# Patient Record
Sex: MALE | Race: WHITE | NOT HISPANIC OR LATINO | Employment: OTHER
[De-identification: names, ages, dates, MRNs, and addresses within clinical notes are randomized per-mention and may not be internally consistent; named-entity substitution may affect disease eponyms.]

---

## 2017-01-19 ENCOUNTER — CHARTING TRANS (OUTPATIENT)
Dept: OTHER | Age: 67
End: 2017-01-19

## 2017-01-31 ENCOUNTER — CHARTING TRANS (OUTPATIENT)
Dept: OTHER | Age: 67
End: 2017-01-31

## 2017-02-07 ENCOUNTER — CHARTING TRANS (OUTPATIENT)
Dept: OTHER | Age: 67
End: 2017-02-07

## 2017-02-16 ENCOUNTER — TELEPHONE (OUTPATIENT)
Dept: FAMILY MEDICINE CLINIC | Facility: CLINIC | Age: 67
End: 2017-02-16

## 2017-03-03 ENCOUNTER — OFFICE VISIT (OUTPATIENT)
Dept: FAMILY MEDICINE CLINIC | Facility: CLINIC | Age: 67
End: 2017-03-03

## 2017-03-03 VITALS
BODY MASS INDEX: 27 KG/M2 | SYSTOLIC BLOOD PRESSURE: 132 MMHG | WEIGHT: 202 LBS | DIASTOLIC BLOOD PRESSURE: 86 MMHG | RESPIRATION RATE: 16 BRPM | TEMPERATURE: 98 F | HEART RATE: 76 BPM | OXYGEN SATURATION: 98 %

## 2017-03-03 DIAGNOSIS — R05.9 COUGH: ICD-10-CM

## 2017-03-03 DIAGNOSIS — J01.90 ACUTE SINUSITIS, RECURRENCE NOT SPECIFIED, UNSPECIFIED LOCATION: Primary | ICD-10-CM

## 2017-03-03 PROCEDURE — 99213 OFFICE O/P EST LOW 20 MIN: CPT | Performed by: PHYSICIAN ASSISTANT

## 2017-03-03 RX ORDER — AMOXICILLIN AND CLAVULANATE POTASSIUM 875; 125 MG/1; MG/1
1 TABLET, FILM COATED ORAL 2 TIMES DAILY
Qty: 20 TABLET | Refills: 0 | Status: SHIPPED | OUTPATIENT
Start: 2017-03-03 | End: 2017-03-13

## 2017-03-03 RX ORDER — CODEINE PHOSPHATE AND GUAIFENESIN 10; 100 MG/5ML; MG/5ML
10 SOLUTION ORAL NIGHTLY PRN
Qty: 120 ML | Refills: 0 | Status: SHIPPED | OUTPATIENT
Start: 2017-03-03 | End: 2017-03-13

## 2017-03-03 NOTE — PATIENT INSTRUCTIONS
Self-Care for Sinusitis     Drinking plenty of water can help sinuses drain. Sinusitis can often be managed with self-care. Self-care can keep sinuses moist and make you feel more comfortable. Remember to follow your doctor's instructions closely.  This Colds, flu, and allergies make it more likely for you to get sinusitis. Do your best to prevent sinusitis by preventing these problems. Do what you can to avoid getting colds and other infections. Stay away from things that cause allergies (allergens).  Karin Wolf · Stay away from all types of smoke, which dries out sinus linings. This includes tobacco smoke and chemical smoke in workplace settings. · Use saltwater rinses.   Date Last Reviewed: 10/1/2016  © 7253-6847 The [address]

## 2017-03-03 NOTE — PROGRESS NOTES
CHIEF COMPLAINT:   Patient presents with:  Nasal Congestion: chest congestion. HPI:   Ramana Anguiano is a 74year old male who presents for upper and lower respiratory symptoms for  3 days.   Patient reports PND, sore throat only at the beginning of sx's ARTHROSCOPY OF JOINT UNLISTED        8/2/2014    Comment Procedure: ACHILLES TENDON REPAIR/LENGTHENING;  Surgeon: Henrik Boateng MD;  Location:  MAIN OR           Smoking Status: Never Smoker                      Alcohol Use: Yes                Comment Meds & Refills for this Visit:    Signed Prescriptions Disp Refills    Amoxicillin-Pot Clavulanate 875-125 MG Oral Tab 20 tablet 0      Sig: Take 1 tablet by mouth 2 (two) times daily.       guaiFENesin-codeine (CHERATUSSIN AC) 100-10 MG/5ML Oral Solution 1 Your doctor may prescribe medications to help treat your sinusitis. If you have an infection, antibiotics can help clear it up. If you are prescribed antibiotics, take all pills on schedule until they are gone, even if you feel better.  Decongestants help r · Sit in the nonsmoking sections of restaurants. · Don't go outdoors during peak pollution hours such as rush hour. · Keep an air conditioner on during allergy season. Clean its filter regularly.   · Ask your healthcare provider about a referral to have a

## 2017-05-21 ENCOUNTER — HOSPITAL ENCOUNTER (EMERGENCY)
Age: 67
Discharge: HOME | End: 2017-05-21
Attending: EMERGENCY MEDICINE
Payer: MEDICARE

## 2017-05-21 ENCOUNTER — APPOINTMENT (OUTPATIENT)
Dept: CT IMAGING | Age: 67
End: 2017-05-21
Attending: EMERGENCY MEDICINE
Payer: MEDICARE

## 2017-05-21 VITALS
RESPIRATION RATE: 18 BRPM | HEART RATE: 72 BPM | SYSTOLIC BLOOD PRESSURE: 144 MMHG | BODY MASS INDEX: 27.09 KG/M2 | WEIGHT: 200 LBS | OXYGEN SATURATION: 93 % | TEMPERATURE: 98 F | DIASTOLIC BLOOD PRESSURE: 83 MMHG | HEIGHT: 72 IN

## 2017-05-21 DIAGNOSIS — N20.1 URETEROLITHIASIS: ICD-10-CM

## 2017-05-21 DIAGNOSIS — N23 RENAL COLIC: Primary | ICD-10-CM

## 2017-05-21 PROCEDURE — 96376 TX/PRO/DX INJ SAME DRUG ADON: CPT

## 2017-05-21 PROCEDURE — 74176 CT ABD & PELVIS W/O CONTRAST: CPT | Performed by: EMERGENCY MEDICINE

## 2017-05-21 PROCEDURE — 96375 TX/PRO/DX INJ NEW DRUG ADDON: CPT

## 2017-05-21 PROCEDURE — 96361 HYDRATE IV INFUSION ADD-ON: CPT

## 2017-05-21 PROCEDURE — 99285 EMERGENCY DEPT VISIT HI MDM: CPT

## 2017-05-21 PROCEDURE — 81001 URINALYSIS AUTO W/SCOPE: CPT | Performed by: EMERGENCY MEDICINE

## 2017-05-21 PROCEDURE — 96374 THER/PROPH/DIAG INJ IV PUSH: CPT

## 2017-05-21 PROCEDURE — 80053 COMPREHEN METABOLIC PANEL: CPT | Performed by: EMERGENCY MEDICINE

## 2017-05-21 PROCEDURE — 93005 ELECTROCARDIOGRAM TRACING: CPT

## 2017-05-21 PROCEDURE — 93010 ELECTROCARDIOGRAM REPORT: CPT

## 2017-05-21 PROCEDURE — 83690 ASSAY OF LIPASE: CPT | Performed by: EMERGENCY MEDICINE

## 2017-05-21 PROCEDURE — 87086 URINE CULTURE/COLONY COUNT: CPT | Performed by: EMERGENCY MEDICINE

## 2017-05-21 PROCEDURE — 85025 COMPLETE CBC W/AUTO DIFF WBC: CPT | Performed by: EMERGENCY MEDICINE

## 2017-05-21 RX ORDER — ONDANSETRON 4 MG/1
4 TABLET, ORALLY DISINTEGRATING ORAL EVERY 4 HOURS PRN
Qty: 10 TABLET | Refills: 0 | Status: SHIPPED | OUTPATIENT
Start: 2017-05-21 | End: 2017-05-28

## 2017-05-21 RX ORDER — SODIUM CHLORIDE 9 MG/ML
INJECTION, SOLUTION INTRAVENOUS ONCE
Status: COMPLETED | OUTPATIENT
Start: 2017-05-21 | End: 2017-05-21

## 2017-05-21 RX ORDER — HYDROCODONE BITARTRATE AND ACETAMINOPHEN 10; 325 MG/1; MG/1
1-2 TABLET ORAL EVERY 4 HOURS PRN
Qty: 20 TABLET | Refills: 0 | Status: SHIPPED | OUTPATIENT
Start: 2017-05-21 | End: 2017-05-28

## 2017-05-21 RX ORDER — ONDANSETRON 2 MG/ML
4 INJECTION INTRAMUSCULAR; INTRAVENOUS ONCE
Status: COMPLETED | OUTPATIENT
Start: 2017-05-21 | End: 2017-05-21

## 2017-05-21 RX ORDER — HYDROMORPHONE HYDROCHLORIDE 1 MG/ML
INJECTION, SOLUTION INTRAMUSCULAR; INTRAVENOUS; SUBCUTANEOUS EVERY 30 MIN PRN
Status: DISCONTINUED | OUTPATIENT
Start: 2017-05-21 | End: 2017-05-21

## 2017-05-21 NOTE — ED PROVIDER NOTES
Patient Seen in: THE [facility] In [place]    History   Patient presents with:  Abdomen/Flank Pain (GI/)    Stated Complaint: RIGHT FLANK PAIN    HPI    Patient has a history of multiple kidney stones in the past.  He is sure he has ano Alcohol Use: Yes                Comment: holidays      Review of Systems    Positive for stated complaint: RIGHT FLANK PAIN  Other systems are as noted in HPI. Constitutional and vital signs reviewed.       All other systems reviewed a Large (*)     Protein Urine 30 mg/dL (*)     All other components within normal limits   URINE MICROSCOPIC W REFLEX CULTURE - Abnormal; Notable for the following:     WBC Urine 5-10 (*)     RBC URINE >10 (*)     Bacteria Urine Rare (*)     Hyaline Casts Pr sent home with pain medication and Zofran. We offered admission for pain control the patient feels he can manage at home. Patient is aware of the elevated creatinine and the implications.       Disposition and Plan     Clinical Impression:  Renal colic  (

## 2017-05-22 PROBLEM — N20.1 RIGHT URETERAL STONE: Status: ACTIVE | Noted: 2017-05-22

## 2017-05-22 PROBLEM — R10.9 RIGHT FLANK PAIN: Status: ACTIVE | Noted: 2017-05-22

## 2017-05-30 PROBLEM — N20.0 BILATERAL KIDNEY STONES: Status: ACTIVE | Noted: 2017-05-30

## 2017-06-08 PROCEDURE — 82507 ASSAY OF CITRATE: CPT | Performed by: UROLOGY

## 2017-06-08 PROCEDURE — 82436 ASSAY OF URINE CHLORIDE: CPT | Performed by: UROLOGY

## 2017-06-08 PROCEDURE — 83945 ASSAY OF OXALATE: CPT | Performed by: UROLOGY

## 2017-06-08 PROCEDURE — 82340 ASSAY OF CALCIUM IN URINE: CPT | Performed by: UROLOGY

## 2017-06-08 PROCEDURE — 84392 ASSAY OF URINE SULFATE: CPT | Performed by: UROLOGY

## 2017-07-19 PROBLEM — N40.1 BENIGN PROSTATIC HYPERPLASIA WITH LOWER URINARY TRACT SYMPTOMS, SYMPTOM DETAILS UNSPECIFIED: Status: ACTIVE | Noted: 2017-07-19

## 2017-07-19 PROBLEM — N20.0 HYPOCITRATURIC CALCIUM NEPHROLITHIASIS: Status: ACTIVE | Noted: 2017-07-19

## 2017-10-26 ENCOUNTER — CHARTING TRANS (OUTPATIENT)
Dept: GASTROENTEROLOGY | Age: 67
End: 2017-10-26

## 2017-11-17 ENCOUNTER — HOSPITAL ENCOUNTER (OUTPATIENT)
Dept: GENERAL RADIOLOGY | Age: 67
Discharge: HOME | End: 2017-11-17
Attending: PHYSICIAN ASSISTANT
Payer: MEDICARE

## 2017-11-17 ENCOUNTER — OFFICE VISIT (OUTPATIENT)
Dept: FAMILY MEDICINE CLINIC | Facility: CLINIC | Age: 67
End: 2017-11-17

## 2017-11-17 VITALS
SYSTOLIC BLOOD PRESSURE: 116 MMHG | OXYGEN SATURATION: 98 % | HEIGHT: 69.5 IN | WEIGHT: 203 LBS | RESPIRATION RATE: 16 BRPM | DIASTOLIC BLOOD PRESSURE: 80 MMHG | HEART RATE: 90 BPM | TEMPERATURE: 99 F | BODY MASS INDEX: 29.39 KG/M2

## 2017-11-17 DIAGNOSIS — M25.511 ACUTE PAIN OF RIGHT SHOULDER: ICD-10-CM

## 2017-11-17 DIAGNOSIS — M25.511 ACUTE PAIN OF RIGHT SHOULDER: Primary | ICD-10-CM

## 2017-11-17 DIAGNOSIS — M54.2 NECK PAIN: ICD-10-CM

## 2017-11-17 PROCEDURE — 99214 OFFICE O/P EST MOD 30 MIN: CPT | Performed by: PHYSICIAN ASSISTANT

## 2017-11-17 PROCEDURE — 73030 X-RAY EXAM OF SHOULDER: CPT | Performed by: PHYSICIAN ASSISTANT

## 2017-11-17 PROCEDURE — 72050 X-RAY EXAM NECK SPINE 4/5VWS: CPT | Performed by: PHYSICIAN ASSISTANT

## 2017-11-17 RX ORDER — METHYLPREDNISOLONE 4 MG/1
TABLET ORAL
Qty: 1 KIT | Refills: 0 | Status: SHIPPED | OUTPATIENT
Start: 2017-11-17 | End: 2018-07-25

## 2017-11-17 NOTE — PROGRESS NOTES
Lb James is a 69year old male. HPI:   Patient's presenting with an injury to: right shoulder. Patient reports he was shampooing his hair 2 days ago. He put his right arm up to wash his air hair and he felt a pop in his shoulder.   He has been otherwise  SKIN: denies any unusual skin lesions or rashes, did have immediate swelling to area.    RESPIRATORY: denies shortness of breath with exertion  CARDIOVASCULAR: denies chest pain on exertion  GI: denies abdominal pain and denies heartburn  NEURO: TECHNIQUE:  Multiple views were obtained. COMPARISON:  None.      INDICATIONS:  M25.511 Pain in right shoulder     PATIENT STATED HISTORY: (As transcribed by Technologist)  Pt was washing his hair on 11/15, when he felt a pop in the posterior should with anterior endplate osteophytes. Narrowing of the C1-2 articulation/atlantodental interval.  PARASPINOUS:  Surgical clips noted suggesting previous thyroid surgery. OTHER:  Negative.        =====  CONCLUSION:  No acute fracture.  Moderate multiple level check frequently for any blisters or vesicles. If vesicles or blisters are present, patient need to return immediately. Does not currently appear to be shingles, but could be early.   -Patient advised to take tylenol for pain.         Signed Prescriptions

## 2017-11-27 ENCOUNTER — CHARTING TRANS (OUTPATIENT)
Dept: OTHER | Age: 67
End: 2017-11-27

## 2017-11-30 ENCOUNTER — LAB SERVICES (OUTPATIENT)
Dept: OTHER | Age: 67
End: 2017-11-30

## 2017-12-01 LAB — TXT: NORMAL

## 2017-12-07 ENCOUNTER — CHARTING TRANS (OUTPATIENT)
Dept: OTHER | Age: 67
End: 2017-12-07

## 2018-06-06 ENCOUNTER — CHARTING TRANS (OUTPATIENT)
Dept: OTHER | Age: 68
End: 2018-06-06

## 2018-08-10 ENCOUNTER — APPOINTMENT (OUTPATIENT)
Dept: GENERAL RADIOLOGY | Facility: HOSPITAL | Age: 68
DRG: 470 | End: 2018-08-10
Attending: ORTHOPAEDIC SURGERY
Payer: MEDICARE

## 2018-08-10 ENCOUNTER — ANESTHESIA EVENT (OUTPATIENT)
Dept: SURGERY | Facility: HOSPITAL | Age: 68
DRG: 470 | End: 2018-08-10
Payer: MEDICARE

## 2018-08-10 ENCOUNTER — ANESTHESIA (OUTPATIENT)
Dept: SURGERY | Facility: HOSPITAL | Age: 68
DRG: 470 | End: 2018-08-10
Payer: MEDICARE

## 2018-08-10 ENCOUNTER — HOSPITAL ENCOUNTER (INPATIENT)
Facility: HOSPITAL | Age: 68
LOS: 1 days | Discharge: HOME HEALTH | DRG: 470 | End: 2018-08-11
Attending: ORTHOPAEDIC SURGERY | Admitting: ORTHOPAEDIC SURGERY
Payer: MEDICARE

## 2018-08-10 PROBLEM — M25.559 HIP PAIN: Status: ACTIVE | Noted: 2018-08-10

## 2018-08-10 LAB
ANTIBODY SCREEN: NEGATIVE
RH BLOOD TYPE: POSITIVE

## 2018-08-10 PROCEDURE — 86901 BLOOD TYPING SEROLOGIC RH(D): CPT | Performed by: ORTHOPAEDIC SURGERY

## 2018-08-10 PROCEDURE — 73502 X-RAY EXAM HIP UNI 2-3 VIEWS: CPT | Performed by: ORTHOPAEDIC SURGERY

## 2018-08-10 PROCEDURE — 0SRB04A REPLACEMENT OF LEFT HIP JOINT WITH CERAMIC ON POLYETHYLENE SYNTHETIC SUBSTITUTE, UNCEMENTED, OPEN APPROACH: ICD-10-PCS | Performed by: ORTHOPAEDIC SURGERY

## 2018-08-10 PROCEDURE — 86900 BLOOD TYPING SEROLOGIC ABO: CPT | Performed by: ORTHOPAEDIC SURGERY

## 2018-08-10 PROCEDURE — 86850 RBC ANTIBODY SCREEN: CPT | Performed by: ORTHOPAEDIC SURGERY

## 2018-08-10 PROCEDURE — 76001 XR C-ARM FLUORO >1 HOUR  (CPT=76001): CPT | Performed by: ORTHOPAEDIC SURGERY

## 2018-08-10 PROCEDURE — 36415 COLL VENOUS BLD VENIPUNCTURE: CPT | Performed by: ORTHOPAEDIC SURGERY

## 2018-08-10 PROCEDURE — 88311 DECALCIFY TISSUE: CPT | Performed by: ORTHOPAEDIC SURGERY

## 2018-08-10 PROCEDURE — 88305 TISSUE EXAM BY PATHOLOGIST: CPT | Performed by: ORTHOPAEDIC SURGERY

## 2018-08-10 DEVICE — FEMORAL HEAD Ø 36 SIZE L
Type: IMPLANTABLE DEVICE | Site: HIP | Status: FUNCTIONAL
Brand: MECTACER BIOLOX DELTA FEMORAL BALL HEAD

## 2018-08-10 DEVICE — FLAT PE  HC LINER Ø 36 / F
Type: IMPLANTABLE DEVICE | Site: HIP | Status: FUNCTIONAL
Brand: MPACT ACETABULAR SYSTEM

## 2018-08-10 RX ORDER — MORPHINE SULFATE 4 MG/ML
4 INJECTION, SOLUTION INTRAMUSCULAR; INTRAVENOUS EVERY 10 MIN PRN
Status: DISCONTINUED | OUTPATIENT
Start: 2018-08-10 | End: 2018-08-10 | Stop reason: HOSPADM

## 2018-08-10 RX ORDER — SODIUM CHLORIDE 9 MG/ML
INJECTION, SOLUTION INTRAVENOUS CONTINUOUS
Status: DISCONTINUED | OUTPATIENT
Start: 2018-08-10 | End: 2018-08-11

## 2018-08-10 RX ORDER — HYDROMORPHONE HYDROCHLORIDE 1 MG/ML
INJECTION, SOLUTION INTRAMUSCULAR; INTRAVENOUS; SUBCUTANEOUS AS NEEDED
Status: DISCONTINUED | OUTPATIENT
Start: 2018-08-10 | End: 2018-08-10 | Stop reason: SURG

## 2018-08-10 RX ORDER — GABAPENTIN 300 MG/1
600 CAPSULE ORAL ONCE
Status: COMPLETED | OUTPATIENT
Start: 2018-08-10 | End: 2018-08-10

## 2018-08-10 RX ORDER — HYDROCODONE BITARTRATE AND ACETAMINOPHEN 5; 325 MG/1; MG/1
1 TABLET ORAL AS NEEDED
Status: DISCONTINUED | OUTPATIENT
Start: 2018-08-10 | End: 2018-08-10 | Stop reason: HOSPADM

## 2018-08-10 RX ORDER — FAMOTIDINE 20 MG/1
20 TABLET ORAL ONCE
Status: DISCONTINUED | OUTPATIENT
Start: 2018-08-10 | End: 2018-08-10 | Stop reason: HOSPADM

## 2018-08-10 RX ORDER — NALOXONE HYDROCHLORIDE 0.4 MG/ML
80 INJECTION, SOLUTION INTRAMUSCULAR; INTRAVENOUS; SUBCUTANEOUS AS NEEDED
Status: DISCONTINUED | OUTPATIENT
Start: 2018-08-10 | End: 2018-08-10 | Stop reason: HOSPADM

## 2018-08-10 RX ORDER — FAMOTIDINE 10 MG/ML
20 INJECTION, SOLUTION INTRAVENOUS 2 TIMES DAILY
Status: DISCONTINUED | OUTPATIENT
Start: 2018-08-10 | End: 2018-08-11

## 2018-08-10 RX ORDER — DEXAMETHASONE SODIUM PHOSPHATE 4 MG/ML
VIAL (ML) INJECTION AS NEEDED
Status: DISCONTINUED | OUTPATIENT
Start: 2018-08-10 | End: 2018-08-10 | Stop reason: SURG

## 2018-08-10 RX ORDER — CEFAZOLIN SODIUM/WATER 2 G/20 ML
2 SYRINGE (ML) INTRAVENOUS EVERY 8 HOURS
Status: COMPLETED | OUTPATIENT
Start: 2018-08-10 | End: 2018-08-11

## 2018-08-10 RX ORDER — MORPHINE SULFATE 2 MG/ML
1 INJECTION, SOLUTION INTRAMUSCULAR; INTRAVENOUS EVERY 2 HOUR PRN
Status: DISCONTINUED | OUTPATIENT
Start: 2018-08-10 | End: 2018-08-11

## 2018-08-10 RX ORDER — HYDROCODONE BITARTRATE AND ACETAMINOPHEN 7.5; 325 MG/1; MG/1
2 TABLET ORAL EVERY 4 HOURS PRN
Status: DISCONTINUED | OUTPATIENT
Start: 2018-08-10 | End: 2018-08-11

## 2018-08-10 RX ORDER — HYDROCODONE BITARTRATE AND ACETAMINOPHEN 5; 325 MG/1; MG/1
2 TABLET ORAL AS NEEDED
Status: DISCONTINUED | OUTPATIENT
Start: 2018-08-10 | End: 2018-08-10 | Stop reason: HOSPADM

## 2018-08-10 RX ORDER — DOCUSATE SODIUM 100 MG/1
100 CAPSULE, LIQUID FILLED ORAL 2 TIMES DAILY
Status: DISCONTINUED | OUTPATIENT
Start: 2018-08-10 | End: 2018-08-11

## 2018-08-10 RX ORDER — BUPIVACAINE HYDROCHLORIDE AND EPINEPHRINE 2.5; 5 MG/ML; UG/ML
15 INJECTION, SOLUTION INFILTRATION; PERINEURAL ONCE
Status: COMPLETED | OUTPATIENT
Start: 2018-08-10 | End: 2018-08-10

## 2018-08-10 RX ORDER — ROCURONIUM BROMIDE 10 MG/ML
INJECTION, SOLUTION INTRAVENOUS AS NEEDED
Status: DISCONTINUED | OUTPATIENT
Start: 2018-08-10 | End: 2018-08-10 | Stop reason: SURG

## 2018-08-10 RX ORDER — HYDROCODONE BITARTRATE AND ACETAMINOPHEN 7.5; 325 MG/1; MG/1
1 TABLET ORAL EVERY 4 HOURS PRN
Status: DISCONTINUED | OUTPATIENT
Start: 2018-08-10 | End: 2018-08-11

## 2018-08-10 RX ORDER — DILTIAZEM HYDROCHLORIDE 300 MG/1
120 CAPSULE, EXTENDED RELEASE ORAL DAILY
Status: DISCONTINUED | OUTPATIENT
Start: 2018-08-10 | End: 2018-08-10

## 2018-08-10 RX ORDER — NEOSTIGMINE METHYLSULFATE 0.5 MG/ML
INJECTION INTRAVENOUS AS NEEDED
Status: DISCONTINUED | OUTPATIENT
Start: 2018-08-10 | End: 2018-08-10 | Stop reason: SURG

## 2018-08-10 RX ORDER — DILTIAZEM HYDROCHLORIDE 300 MG/1
300 CAPSULE, COATED, EXTENDED RELEASE ORAL DAILY
Status: DISCONTINUED | OUTPATIENT
Start: 2018-08-10 | End: 2018-08-11

## 2018-08-10 RX ORDER — SODIUM CHLORIDE, SODIUM LACTATE, POTASSIUM CHLORIDE, CALCIUM CHLORIDE 600; 310; 30; 20 MG/100ML; MG/100ML; MG/100ML; MG/100ML
INJECTION, SOLUTION INTRAVENOUS CONTINUOUS
Status: DISCONTINUED | OUTPATIENT
Start: 2018-08-10 | End: 2018-08-10 | Stop reason: HOSPADM

## 2018-08-10 RX ORDER — SCOLOPAMINE TRANSDERMAL SYSTEM 1 MG/1
1 PATCH, EXTENDED RELEASE TRANSDERMAL ONCE
Status: DISCONTINUED | OUTPATIENT
Start: 2018-08-10 | End: 2018-08-11

## 2018-08-10 RX ORDER — ACETAMINOPHEN 325 MG/1
650 TABLET ORAL EVERY 4 HOURS PRN
Status: DISCONTINUED | OUTPATIENT
Start: 2018-08-10 | End: 2018-08-11

## 2018-08-10 RX ORDER — ACETAMINOPHEN 500 MG
1000 TABLET ORAL ONCE
Status: DISCONTINUED | OUTPATIENT
Start: 2018-08-10 | End: 2018-08-10

## 2018-08-10 RX ORDER — SODIUM PHOSPHATE, DIBASIC AND SODIUM PHOSPHATE, MONOBASIC 7; 19 G/133ML; G/133ML
1 ENEMA RECTAL ONCE AS NEEDED
Status: DISCONTINUED | OUTPATIENT
Start: 2018-08-10 | End: 2018-08-11

## 2018-08-10 RX ORDER — DIPHENHYDRAMINE HYDROCHLORIDE 50 MG/ML
25 INJECTION INTRAMUSCULAR; INTRAVENOUS ONCE AS NEEDED
Status: ACTIVE | OUTPATIENT
Start: 2018-08-10 | End: 2018-08-10

## 2018-08-10 RX ORDER — LATANOPROST 50 UG/ML
1 SOLUTION/ DROPS OPHTHALMIC NIGHTLY
Status: DISCONTINUED | OUTPATIENT
Start: 2018-08-10 | End: 2018-08-11

## 2018-08-10 RX ORDER — METOCLOPRAMIDE 10 MG/1
10 TABLET ORAL ONCE
Status: DISCONTINUED | OUTPATIENT
Start: 2018-08-10 | End: 2018-08-10 | Stop reason: HOSPADM

## 2018-08-10 RX ORDER — DIPHENHYDRAMINE HCL 25 MG
25 CAPSULE ORAL EVERY 4 HOURS PRN
Status: DISCONTINUED | OUTPATIENT
Start: 2018-08-10 | End: 2018-08-11

## 2018-08-10 RX ORDER — MORPHINE SULFATE 10 MG/ML
6 INJECTION, SOLUTION INTRAMUSCULAR; INTRAVENOUS EVERY 10 MIN PRN
Status: DISCONTINUED | OUTPATIENT
Start: 2018-08-10 | End: 2018-08-10 | Stop reason: HOSPADM

## 2018-08-10 RX ORDER — SODIUM CHLORIDE 0.9 % (FLUSH) 0.9 %
10 SYRINGE (ML) INJECTION AS NEEDED
Status: DISCONTINUED | OUTPATIENT
Start: 2018-08-10 | End: 2018-08-11

## 2018-08-10 RX ORDER — MORPHINE SULFATE 2 MG/ML
2 INJECTION, SOLUTION INTRAMUSCULAR; INTRAVENOUS EVERY 2 HOUR PRN
Status: DISCONTINUED | OUTPATIENT
Start: 2018-08-10 | End: 2018-08-11

## 2018-08-10 RX ORDER — MIDAZOLAM HYDROCHLORIDE 1 MG/ML
INJECTION INTRAMUSCULAR; INTRAVENOUS AS NEEDED
Status: DISCONTINUED | OUTPATIENT
Start: 2018-08-10 | End: 2018-08-10 | Stop reason: SURG

## 2018-08-10 RX ORDER — MORPHINE SULFATE 4 MG/ML
4 INJECTION, SOLUTION INTRAMUSCULAR; INTRAVENOUS EVERY 2 HOUR PRN
Status: DISCONTINUED | OUTPATIENT
Start: 2018-08-10 | End: 2018-08-11

## 2018-08-10 RX ORDER — SODIUM CHLORIDE, SODIUM LACTATE, POTASSIUM CHLORIDE, CALCIUM CHLORIDE 600; 310; 30; 20 MG/100ML; MG/100ML; MG/100ML; MG/100ML
INJECTION, SOLUTION INTRAVENOUS CONTINUOUS
Status: DISCONTINUED | OUTPATIENT
Start: 2018-08-10 | End: 2018-08-11

## 2018-08-10 RX ORDER — ACETAMINOPHEN 500 MG
1000 TABLET ORAL ONCE
Status: COMPLETED | OUTPATIENT
Start: 2018-08-10 | End: 2018-08-10

## 2018-08-10 RX ORDER — MORPHINE SULFATE 4 MG/ML
2 INJECTION, SOLUTION INTRAMUSCULAR; INTRAVENOUS EVERY 10 MIN PRN
Status: DISCONTINUED | OUTPATIENT
Start: 2018-08-10 | End: 2018-08-10 | Stop reason: HOSPADM

## 2018-08-10 RX ORDER — POLYETHYLENE GLYCOL 3350 17 G/17G
17 POWDER, FOR SOLUTION ORAL DAILY PRN
Status: DISCONTINUED | OUTPATIENT
Start: 2018-08-10 | End: 2018-08-11

## 2018-08-10 RX ORDER — CEFAZOLIN SODIUM/WATER 2 G/20 ML
2 SYRINGE (ML) INTRAVENOUS ONCE
Status: COMPLETED | OUTPATIENT
Start: 2018-08-10 | End: 2018-08-10

## 2018-08-10 RX ORDER — SENNOSIDES 8.6 MG
17.2 TABLET ORAL NIGHTLY
Status: DISCONTINUED | OUTPATIENT
Start: 2018-08-10 | End: 2018-08-11

## 2018-08-10 RX ORDER — LIDOCAINE HYDROCHLORIDE 10 MG/ML
INJECTION, SOLUTION EPIDURAL; INFILTRATION; INTRACAUDAL; PERINEURAL AS NEEDED
Status: DISCONTINUED | OUTPATIENT
Start: 2018-08-10 | End: 2018-08-10 | Stop reason: SURG

## 2018-08-10 RX ORDER — ONDANSETRON 2 MG/ML
INJECTION INTRAMUSCULAR; INTRAVENOUS AS NEEDED
Status: DISCONTINUED | OUTPATIENT
Start: 2018-08-10 | End: 2018-08-10 | Stop reason: SURG

## 2018-08-10 RX ORDER — ALFUZOSIN HYDROCHLORIDE 10 MG/1
10 TABLET, EXTENDED RELEASE ORAL
Status: DISCONTINUED | OUTPATIENT
Start: 2018-08-10 | End: 2018-08-11

## 2018-08-10 RX ORDER — BISACODYL 10 MG
10 SUPPOSITORY, RECTAL RECTAL
Status: DISCONTINUED | OUTPATIENT
Start: 2018-08-10 | End: 2018-08-11

## 2018-08-10 RX ORDER — DIPHENHYDRAMINE HYDROCHLORIDE 50 MG/ML
12.5 INJECTION INTRAMUSCULAR; INTRAVENOUS EVERY 4 HOURS PRN
Status: DISCONTINUED | OUTPATIENT
Start: 2018-08-10 | End: 2018-08-11

## 2018-08-10 RX ORDER — LEVOTHYROXINE SODIUM 0.1 MG/1
100 TABLET ORAL
Status: DISCONTINUED | OUTPATIENT
Start: 2018-08-11 | End: 2018-08-11

## 2018-08-10 RX ORDER — ONDANSETRON 2 MG/ML
4 INJECTION INTRAMUSCULAR; INTRAVENOUS EVERY 4 HOURS PRN
Status: DISCONTINUED | OUTPATIENT
Start: 2018-08-10 | End: 2018-08-11

## 2018-08-10 RX ORDER — FAMOTIDINE 20 MG/1
20 TABLET ORAL 2 TIMES DAILY
Status: DISCONTINUED | OUTPATIENT
Start: 2018-08-10 | End: 2018-08-11

## 2018-08-10 RX ORDER — HALOPERIDOL 5 MG/ML
0.25 INJECTION INTRAMUSCULAR ONCE AS NEEDED
Status: DISCONTINUED | OUTPATIENT
Start: 2018-08-10 | End: 2018-08-10 | Stop reason: HOSPADM

## 2018-08-10 RX ORDER — METOCLOPRAMIDE HYDROCHLORIDE 5 MG/ML
10 INJECTION INTRAMUSCULAR; INTRAVENOUS EVERY 6 HOURS PRN
Status: DISCONTINUED | OUTPATIENT
Start: 2018-08-10 | End: 2018-08-11

## 2018-08-10 RX ORDER — GLYCOPYRROLATE 0.2 MG/ML
INJECTION INTRAMUSCULAR; INTRAVENOUS AS NEEDED
Status: DISCONTINUED | OUTPATIENT
Start: 2018-08-10 | End: 2018-08-10 | Stop reason: SURG

## 2018-08-10 RX ORDER — ONDANSETRON 2 MG/ML
4 INJECTION INTRAMUSCULAR; INTRAVENOUS ONCE AS NEEDED
Status: DISCONTINUED | OUTPATIENT
Start: 2018-08-10 | End: 2018-08-10 | Stop reason: HOSPADM

## 2018-08-10 RX ORDER — CYCLOBENZAPRINE HCL 10 MG
10 TABLET ORAL EVERY 8 HOURS PRN
Status: DISCONTINUED | OUTPATIENT
Start: 2018-08-10 | End: 2018-08-11

## 2018-08-10 RX ADMIN — ONDANSETRON 4 MG: 2 INJECTION INTRAMUSCULAR; INTRAVENOUS at 11:25:00

## 2018-08-10 RX ADMIN — GLYCOPYRROLATE 0.4 MG: 0.2 INJECTION INTRAMUSCULAR; INTRAVENOUS at 11:25:00

## 2018-08-10 RX ADMIN — ROCURONIUM BROMIDE 30 MG: 10 INJECTION, SOLUTION INTRAVENOUS at 10:17:00

## 2018-08-10 RX ADMIN — ROCURONIUM BROMIDE 20 MG: 10 INJECTION, SOLUTION INTRAVENOUS at 10:58:00

## 2018-08-10 RX ADMIN — LIDOCAINE HYDROCHLORIDE 60 MG: 10 INJECTION, SOLUTION EPIDURAL; INFILTRATION; INTRACAUDAL; PERINEURAL at 10:16:00

## 2018-08-10 RX ADMIN — CEFAZOLIN SODIUM/WATER 2 G: 2 G/20 ML SYRINGE (ML) INTRAVENOUS at 10:39:00

## 2018-08-10 RX ADMIN — NEOSTIGMINE METHYLSULFATE 3 MG: 0.5 INJECTION INTRAVENOUS at 11:25:00

## 2018-08-10 RX ADMIN — HYDROMORPHONE HYDROCHLORIDE 0.5 MG: 1 INJECTION, SOLUTION INTRAMUSCULAR; INTRAVENOUS; SUBCUTANEOUS at 11:39:00

## 2018-08-10 RX ADMIN — DEXAMETHASONE SODIUM PHOSPHATE 4 MG: 4 MG/ML VIAL (ML) INJECTION at 10:21:00

## 2018-08-10 RX ADMIN — SODIUM CHLORIDE, SODIUM LACTATE, POTASSIUM CHLORIDE, CALCIUM CHLORIDE: 600; 310; 30; 20 INJECTION, SOLUTION INTRAVENOUS at 10:11:00

## 2018-08-10 RX ADMIN — MIDAZOLAM HYDROCHLORIDE 2 MG: 1 INJECTION INTRAMUSCULAR; INTRAVENOUS at 10:09:00

## 2018-08-10 NOTE — INTERVAL H&P NOTE
Pre-op Diagnosis: Left hip osteoarthritis     The above referenced H&P was reviewed by Steven Loco MD on 8/10/2018, the patient was examined and no significant changes have occurred in the patient's condition since the H&P was performed.   I discussed with

## 2018-08-10 NOTE — ANESTHESIA POSTPROCEDURE EVALUATION
Patient: Kristie Laser    Procedure Summary     Date:  08/10/18 Room / Location:  [address] [place] [address] MAIN OR 06 / [address] [place] [address] MAIN OR    Anesthesia Start:  2391 Anesthesia Stop:      Procedure:  HIP TOTAL REPLACEMENT (Left Hip) Diagnosis:  (Left hip osteoarthritis )    Surgeon:

## 2018-08-10 NOTE — ANESTHESIA PREPROCEDURE EVALUATION
Anesthesia PreOp Note    HPI:     Alma Rosas is a 77year old male who presents for preoperative consultation requested by: Grant Roman MD    Date of Surgery: 8/10/2018    Procedure(s):  HIP TOTAL REPLACEMENT  Indication: Left hip osteoarthritis     R accident  No date: OTHER SURGICAL HISTORY      Comment: thyroid sx  No date: OTHER SURGICAL HISTORY      Comment: Left knee arthroscopy  5/23/17: OTHER SURGICAL HISTORY      Comment: cysto, right eswl, right stent  5/25/17: OTHER SURGICAL HISTORY on file.       Iodine Solution [Po*    HIVES    Comment:Contrast iodine  Flomax [Tamsulosin]     OTHER (SEE COMMENTS)    Comment:Tachycardia  Iodine [Radiology C*    HIVES    Family History   Problem Relation Age of Onset   • Diabetes Father    • Heart Diso forms of anesthetic management. All of the patient's questions were answered to the best of my ability. The patient desires the anesthetic management as planned.   Stephanie Lexie  8/10/2018 9:56 AM

## 2018-08-10 NOTE — ANESTHESIA PROCEDURE NOTES
Airway  Date/Time: 8/10/2018 10:19 AM  Urgency: elective    Airway not difficult    General Information and Staff    Patient location during procedure: OR  Anesthesiologist: Matthias Steele  Resident/CRNA: Shoaib Perez.   Performed: CRNA     Indic

## 2018-08-10 NOTE — H&P
[facility]    PATIENT'S NAME: [address] PHYSICIAN: Danni Hassan.  Trish Acharya MD   PATIENT ACCOUNT#:   [id number]    LOCATION:  [facility] [address] PACU [address] [place] [address]  MEDICAL RECORD #:   [id number]       YOB: 1950  ADMISSION DATE:       0 moderate discomfort. VITAL SIGNS:  Blood pressure 110/58, pulse 56, respirations 9, temperature 97.9 degrees Fahrenheit. HEENT:  Pupils equally reactive and round to light. Extraocular movements are intact.   Mucosa was moist.  Teeth normal.  NECK:  No m

## 2018-08-10 NOTE — OPERATIVE REPORT
Surgery:  08/10/2018  Operative Note  Surgeon: Kristyn Handy MD  Anesthesia Type: General    Pre-Op Diagnosis:   (1) Osteoarthritis of left hip    Post-Op Diagnosis:   (1) Osteoarthritis of left hip    Procedure Performed    left Total hip arthroplasty  Indic to be a severe arthritis about the hip as well as superior osteophyte formation. We made our femoral neck cut according to our preoperative template. We identified the acetabulum. We placed The retractors carefully and started reaming.  We reamed up in 1 mm IMPLANTS: Medacta stem size 3 high offset with a 58 cup, 58 x 36 neutral liner, and a 36, 4 mm Delta ceramic head.

## 2018-08-10 NOTE — CM/SW NOTE
SW received order for s/p total joint and fresh post op. SW met w/ pt to discuss discharge plans. Pt's wife and daughter were also present during the assessment. Pt lives at home w/ his supportive wife in [place].  Pt's daughter, Amanda Butler lives nearby and i

## 2018-08-11 VITALS
WEIGHT: 202 LBS | RESPIRATION RATE: 18 BRPM | HEIGHT: 72 IN | OXYGEN SATURATION: 92 % | HEART RATE: 65 BPM | DIASTOLIC BLOOD PRESSURE: 58 MMHG | TEMPERATURE: 98 F | BODY MASS INDEX: 27.36 KG/M2 | SYSTOLIC BLOOD PRESSURE: 115 MMHG

## 2018-08-11 LAB
ANION GAP SERPL CALC-SCNC: 8 MMOL/L (ref 0–18)
BUN SERPL-MCNC: 21 MG/DL (ref 8–20)
BUN/CREAT SERPL: 17.5 (ref 10–20)
CALCIUM SERPL-MCNC: 8.2 MG/DL (ref 8.5–10.5)
CHLORIDE SERPL-SCNC: 103 MMOL/L (ref 95–110)
CO2 SERPL-SCNC: 24 MMOL/L (ref 22–32)
CREAT SERPL-MCNC: 1.2 MG/DL (ref 0.5–1.5)
ERYTHROCYTE [DISTWIDTH] IN BLOOD BY AUTOMATED COUNT: 13.7 % (ref 11–15)
GLUCOSE SERPL-MCNC: 175 MG/DL (ref 70–99)
HCT VFR BLD AUTO: 39 % (ref 41–52)
HGB BLD-MCNC: 13.1 G/DL (ref 13.5–17.5)
MCH RBC QN AUTO: 30.4 PG (ref 27–32)
MCHC RBC AUTO-ENTMCNC: 33.6 G/DL (ref 32–37)
MCV RBC AUTO: 90.4 FL (ref 80–100)
OSMOLALITY UR CALC.SUM OF ELEC: 287 MOSM/KG (ref 275–295)
PLATELET # BLD AUTO: 265 K/UL (ref 140–400)
PMV BLD AUTO: 8.9 FL (ref 7.4–10.3)
POTASSIUM SERPL-SCNC: 4.4 MMOL/L (ref 3.3–5.1)
RBC # BLD AUTO: 4.32 M/UL (ref 4.5–5.9)
SODIUM SERPL-SCNC: 135 MMOL/L (ref 136–144)
WBC # BLD AUTO: 14 K/UL (ref 4–11)

## 2018-08-11 PROCEDURE — 97530 THERAPEUTIC ACTIVITIES: CPT

## 2018-08-11 PROCEDURE — 80048 BASIC METABOLIC PNL TOTAL CA: CPT | Performed by: ORTHOPAEDIC SURGERY

## 2018-08-11 PROCEDURE — 97166 OT EVAL MOD COMPLEX 45 MIN: CPT

## 2018-08-11 PROCEDURE — 97116 GAIT TRAINING THERAPY: CPT

## 2018-08-11 PROCEDURE — 85027 COMPLETE CBC AUTOMATED: CPT | Performed by: ORTHOPAEDIC SURGERY

## 2018-08-11 PROCEDURE — 97161 PT EVAL LOW COMPLEX 20 MIN: CPT

## 2018-08-11 NOTE — PHYSICAL THERAPY NOTE
PHYSICAL THERAPY EVALUATION - INPATIENT     Room Number: 420/420-A  Evaluation Date: 8/11/2018  Type of Evaluation: Initial   Physician Order: PT Eval and Treat    Presenting Problem: L ERNESTO  Reason for Therapy: Mobility Dysfunction and Discharge Planning Potential : Good  Frequency (Obs): BID       PHYSICAL THERAPY MEDICAL/SOCIAL HISTORY     History related to current admission: surgery 8/10 by Magali Early    Problem List  Active Problems:    Hip pain      Past Medical History  Past Medical History:   Diagnosis as Tolerated    PAIN ASSESSMENT  Ratin  Location: L ERNESTO  Management Techniques: Activity promotion; Body mechanics;Repositioning    COGNITION  · Overall Cognitive Status:  WFL - within functional limits    RANGE OF MOTION AND STRENGTH ASSESSMENT  Upper training    Patient End of Session: In bed;Needs met;Call light within reach;RN aware of session/findings; All patient questions and concerns addressed; Family present;SCDs in place    CURRENT GOALS    Goals to be met by: 8/18/18  Patient Goal Patient's self

## 2018-08-11 NOTE — CONSULTS
[facility]    Report of Consultation    Zebedee Lawrence Patient Status:  Inpatient    1950 MRN [id number]   Location [facility] 4W/SW/SE Attending Michelle Pinto MD   Hosp Day # 0 PCP Nellie Candelaria     Date of Admission:  8/10/20 Alcohol use:  Yes              Comment: holidays           Current Medications:    Current Facility-Administered Medications:  lactated ringers infusion  Intravenous Continuous   scopolamine (TRANSDERM-SCOP) patch 1 patch Transdermal Once   [ST hydrocodone-acetaminophen (NORCO) 7.5-325 MG per tab 2 tablet 2 tablet Oral Q4H PRN   morphINE sulfate (PF) 2 MG/ML injection 1 mg 1 mg Intravenous Q2H PRN   Or      morphINE sulfate (PF) 2 MG/ML injection 2 mg 2 mg Intravenous Q2H PRN   Or      morphINE ringers 20 mL/hr at 08/10/18 0908   • sodium chloride 125 mL/hr at 08/10/18 2022       Head: Normocephalic, without obvious abnormality, atraumatic  Pulmonary:  clear to auscultation bilaterally  Chest wall: no tenderness  Cardiovascular: S1, S2 normal, no abnormalities  Impression/ Recommendations[de-identified]    1 Hip pain S/P hip surgery, patient on pain medication doing well, primary team managing pain     2 HTN stable on diltiazem continue same dose      3 previous arrhythimia not sure if SVT or PVCs, well control
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
## 2018-08-11 NOTE — PLAN OF CARE
CARDIOVASCULAR - ADULT    • Maintains optimal cardiac output and hemodynamic stability Adequate for Discharge    • Absence of cardiac arrhythmias or at baseline Adequate for Discharge        DISCHARGE PLANNING    • Discharge to home or other facility with and stairs and is awaiting for Dr. Daniella Chirinos to discharge him for home. Patient is voiding still frequently in small amounts, urine still strained for stones. Patient takes Scottie Darlene for pain but very cautiously.   Patient denies bowel movement but is passing

## 2018-08-11 NOTE — CM/SW NOTE
8/11CM- The Patient's RN informed this Writer that the Patient is medically stable for discharge today(8/11). Case Management previously noted that a referral was made to LOYDA Navarro.   This Writer informed Gaviota Caldwell at  [facility] ([phone] fax [phone])  that

## 2018-08-11 NOTE — PROGRESS NOTES
[address] Patient Status:  Inpatient    1950 MRN [id number]   Location [facility] 4W/SW/SE Attending Mira Casiano MD   Hosp Day # 1 PCP ANTONIO SELBY     Subjective:  Post-Operative Day: 1 Status Post left Total Hip infusion  Intravenous Continuous   apixaban (ELIQUIS) tab 2.5 mg 2.5 mg Oral BID   Senna (SENOKOT) tab TABS 17.2 mg 17.2 mg Oral Nightly   docusate sodium (COLACE) cap 100 mg 100 mg Oral BID   PEG 3350 (MIRALAX) powder packet 17 g 17 g Oral Daily PRN   mag exam.   Motor Exam: Motor and sensation intact at baseline in  left lower extremity     Data Review  CBC:   Lab Results  Component Value Date   WBC 14.0 (H) 08/11/2018   RBC 4.32 (L) 08/11/2018   HGB 13.1 (L) 08/11/2018   HCT 39.0 (L) 08/11/2018

## 2018-08-11 NOTE — PROGRESS NOTES
Cardiology progress note    24 h events VS stable          Current Medications:    Current Facility-Administered Medications:  lactated ringers infusion  Intravenous Continuous   scopolamine (TRANSDERM-SCOP) patch 1 patch Transdermal Once   Levothyroxine S PRN   Or      morphINE sulfate (PF) 4 MG/ML injection 4 mg 4 mg Intravenous Q2H PRN   DilTIAZem HCl ER Coated Beads (CARDIZEM CD) 24 hr cap 300 mg 300 mg Oral Daily   Brinzolamide-Brimonidine 1-0.2 % SUSP 1 drop 1 drop Both Eyes Q12H   latanoprost (XALATAN (CST): Kirby Conklin MD on 8/10/2018 at 11:58     Approved by (CST): Dari Conklin MD on 8/10/2018 at 11:59          Xr Hip W Or Wo Pelvis 2 Or 3 Views, Left (cpt=73502)    Result Date: 8/10/2018  CONCLUSION:  1.  Status post left hip ar

## 2018-08-12 NOTE — PROGRESS NOTES
[facility]    Progress Note    Primitivo Pritchard Patient Status:  Inpatient    1950 MRN [id number]   Location [facility] 4W/SW/SE Attending Milagros Najera MD   Hosp Day # 1 PCP ANTONIO SELBY       Subjective:   Primitivo Pritchard is a( BILT 0.6 05/21/2017   TP 7.6 05/21/2017   AST 14 (L) 05/21/2017   ALT 24 05/21/2017    05/21/2017       No procedure found. Xr C-arm Fluoro >1 Hour  (cpt=76001)    Result Date: 8/10/2018  CONCLUSION:  1. Intraoperative fluoroscopy was utilized.

## 2018-11-11 ENCOUNTER — NURSE ONLY (OUTPATIENT)
Dept: FAMILY MEDICINE CLINIC | Facility: CLINIC | Age: 68
End: 2018-11-11
Payer: MEDICARE

## 2018-11-11 VITALS
HEIGHT: 69.2 IN | SYSTOLIC BLOOD PRESSURE: 138 MMHG | BODY MASS INDEX: 30.87 KG/M2 | RESPIRATION RATE: 18 BRPM | TEMPERATURE: 98 F | DIASTOLIC BLOOD PRESSURE: 60 MMHG | OXYGEN SATURATION: 98 % | HEART RATE: 67 BPM | WEIGHT: 210.81 LBS

## 2018-11-11 DIAGNOSIS — J40 BRONCHITIS: Primary | ICD-10-CM

## 2018-11-11 PROCEDURE — 99213 OFFICE O/P EST LOW 20 MIN: CPT | Performed by: NURSE PRACTITIONER

## 2018-11-11 RX ORDER — BENZONATATE 100 MG/1
100 CAPSULE ORAL 3 TIMES DAILY PRN
Qty: 30 CAPSULE | Refills: 0 | Status: SHIPPED | OUTPATIENT
Start: 2018-11-11 | End: 2020-03-03 | Stop reason: ALTCHOICE

## 2018-11-11 RX ORDER — AZITHROMYCIN 250 MG/1
TABLET, FILM COATED ORAL
Qty: 6 TABLET | Refills: 0 | Status: SHIPPED | OUTPATIENT
Start: 2018-11-11 | End: 2018-11-12
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
## 2018-11-12 ENCOUNTER — TELEPHONE (OUTPATIENT)
Dept: FAMILY MEDICINE CLINIC | Facility: CLINIC | Age: 68
End: 2018-11-12

## 2018-11-12 RX ORDER — AMOXICILLIN AND CLAVULANATE POTASSIUM 875; 125 MG/1; MG/1
TABLET, FILM COATED ORAL
Qty: 20 TABLET | Refills: 0 | Status: SHIPPED | OUTPATIENT
Start: 2018-11-12 | End: 2019-02-20 | Stop reason: ALTCHOICE

## 2018-11-12 NOTE — TELEPHONE ENCOUNTER
Pt calling requesting antibiotic change. States the azithromycin is causing diarrhea since he started it yesterday. Reports intermittent loose stools, denies bloody/black/tar like stools, denies abdominal pain, denies N/V.   Confirms he has done well on A

## 2018-11-20 ENCOUNTER — CHARTING TRANS (OUTPATIENT)
Dept: OTHER | Age: 68
End: 2018-11-20

## 2018-11-28 VITALS
DIASTOLIC BLOOD PRESSURE: 70 MMHG | WEIGHT: 198 LBS | RESPIRATION RATE: 16 BRPM | SYSTOLIC BLOOD PRESSURE: 126 MMHG | HEART RATE: 64 BPM | HEIGHT: 72 IN | BODY MASS INDEX: 26.82 KG/M2

## 2019-01-14 ENCOUNTER — TELEPHONE (OUTPATIENT)
Dept: SCHEDULING | Age: 69
End: 2019-01-14

## 2019-01-17 LAB — GLUCOSE, POC: 120 MG/DL

## 2019-01-18 LAB — TEXT: NORMAL

## 2019-02-20 PROBLEM — R39.12 BENIGN PROSTATIC HYPERPLASIA WITH WEAK URINARY STREAM: Status: ACTIVE | Noted: 2019-02-20

## 2019-02-20 PROBLEM — N40.1 BENIGN PROSTATIC HYPERPLASIA WITH WEAK URINARY STREAM: Status: ACTIVE | Noted: 2019-02-20

## 2019-02-20 PROBLEM — N52.9 ED (ERECTILE DYSFUNCTION): Status: ACTIVE | Noted: 2019-02-20

## 2019-03-05 VITALS — BODY MASS INDEX: 28.04 KG/M2 | HEIGHT: 72 IN | WEIGHT: 207 LBS

## 2019-06-25 ENCOUNTER — ANESTHESIA (OUTPATIENT)
Dept: SURGERY | Facility: HOSPITAL | Age: 69
End: 2019-06-25

## 2019-06-25 ENCOUNTER — HOSPITAL ENCOUNTER (OUTPATIENT)
Facility: HOSPITAL | Age: 69
Setting detail: HOSPITAL OUTPATIENT SURGERY
Discharge: HOME | End: 2019-06-25
Attending: UROLOGY | Admitting: UROLOGY
Payer: MEDICARE

## 2019-06-25 ENCOUNTER — ANESTHESIA EVENT (OUTPATIENT)
Dept: SURGERY | Facility: HOSPITAL | Age: 69
End: 2019-06-25

## 2019-06-25 VITALS
BODY MASS INDEX: 27.53 KG/M2 | WEIGHT: 203.25 LBS | HEIGHT: 72 IN | HEART RATE: 67 BPM | SYSTOLIC BLOOD PRESSURE: 140 MMHG | TEMPERATURE: 98 F | OXYGEN SATURATION: 94 % | RESPIRATION RATE: 16 BRPM | DIASTOLIC BLOOD PRESSURE: 78 MMHG

## 2019-06-25 PROCEDURE — 0T778DZ DILATION OF LEFT URETER WITH INTRALUMINAL DEVICE, VIA NATURAL OR ARTIFICIAL OPENING ENDOSCOPIC: ICD-10-PCS | Performed by: UROLOGY

## 2019-06-25 PROCEDURE — 0TF78ZZ FRAGMENTATION IN LEFT URETER, VIA NATURAL OR ARTIFICIAL OPENING ENDOSCOPIC: ICD-10-PCS | Performed by: UROLOGY

## 2019-06-25 DEVICE — STENT URET 6F 26CM WO GW INL: Type: IMPLANTABLE DEVICE | Site: URETER | Status: FUNCTIONAL

## 2019-06-25 RX ORDER — CEFAZOLIN SODIUM/WATER 2 G/20 ML
2 SYRINGE (ML) INTRAVENOUS ONCE
Status: DISCONTINUED | OUTPATIENT
Start: 2019-06-25 | End: 2019-06-25 | Stop reason: HOSPADM

## 2019-06-25 RX ORDER — SODIUM CHLORIDE, SODIUM LACTATE, POTASSIUM CHLORIDE, CALCIUM CHLORIDE 600; 310; 30; 20 MG/100ML; MG/100ML; MG/100ML; MG/100ML
INJECTION, SOLUTION INTRAVENOUS CONTINUOUS
Status: DISCONTINUED | OUTPATIENT
Start: 2019-06-25 | End: 2019-06-26

## 2019-06-25 RX ORDER — HYDROCODONE BITARTRATE AND ACETAMINOPHEN 5; 325 MG/1; MG/1
1 TABLET ORAL AS NEEDED
Status: DISCONTINUED | OUTPATIENT
Start: 2019-06-25 | End: 2019-06-26

## 2019-06-25 RX ORDER — HYDROMORPHONE HYDROCHLORIDE 1 MG/ML
0.4 INJECTION, SOLUTION INTRAMUSCULAR; INTRAVENOUS; SUBCUTANEOUS EVERY 5 MIN PRN
Status: DISCONTINUED | OUTPATIENT
Start: 2019-06-25 | End: 2019-06-26

## 2019-06-25 RX ORDER — PHENAZOPYRIDINE HYDROCHLORIDE 200 MG/1
200 TABLET, FILM COATED ORAL 3 TIMES DAILY PRN
Qty: 15 TABLET | Refills: 1 | Status: SHIPPED | OUTPATIENT
Start: 2019-06-25 | End: 2019-07-10

## 2019-06-25 RX ORDER — HYDROCODONE BITARTRATE AND ACETAMINOPHEN 5; 325 MG/1; MG/1
2 TABLET ORAL AS NEEDED
Status: DISCONTINUED | OUTPATIENT
Start: 2019-06-25 | End: 2019-06-26

## 2019-06-25 RX ORDER — DIAZEPAM 5 MG/1
5 TABLET ORAL EVERY 8 HOURS PRN
Qty: 20 TABLET | Refills: 0 | Status: SHIPPED | OUTPATIENT
Start: 2019-06-25 | End: 2019-07-15

## 2019-06-25 RX ORDER — ACETAMINOPHEN 500 MG
1000 TABLET ORAL ONCE
Status: DISCONTINUED | OUTPATIENT
Start: 2019-06-25 | End: 2019-06-25 | Stop reason: HOSPADM

## 2019-06-25 RX ORDER — ONDANSETRON 2 MG/ML
4 INJECTION INTRAMUSCULAR; INTRAVENOUS AS NEEDED
Status: DISCONTINUED | OUTPATIENT
Start: 2019-06-25 | End: 2019-06-26

## 2019-06-25 RX ORDER — CIPROFLOXACIN 500 MG/1
500 TABLET, FILM COATED ORAL 2 TIMES DAILY
Qty: 14 TABLET | Refills: 0 | Status: SHIPPED | OUTPATIENT
Start: 2019-06-25 | End: 2019-07-02

## 2019-06-25 RX ORDER — NALOXONE HYDROCHLORIDE 0.4 MG/ML
80 INJECTION, SOLUTION INTRAMUSCULAR; INTRAVENOUS; SUBCUTANEOUS AS NEEDED
Status: DISCONTINUED | OUTPATIENT
Start: 2019-06-25 | End: 2019-06-26

## 2019-06-25 NOTE — ANESTHESIA PREPROCEDURE EVALUATION
PRE-OP EVALUATION    Patient Name: Mika Salvador    Pre-op Diagnosis: Ureteral calculi [N20.1]    Procedure(s):  CYSTOSCOPY, LEFT RETROGRADE, PYELOGRAM, LEFT URETEROSCOPY, LASER LITHOTRIPS, LEFT STENT PLACEMENT    Surgeon(s) and Role:     * Fitz Andrews REPAIR/LENGTHENING Left 8/2/2014    Performed by Mari Leiws MD at [facility] MAIN OR   • ARTHROSCOPY OF JOINT UNLISTED     • CATARACT     • ESWL LITHOTRIPSY WITH CYSTOSCOPY, STENT PLACEMENT Right 5/23/2017    Performed by David Martin MD at [address] [place] [address]

## 2019-06-25 NOTE — H&P
History & Physical Examination    Patient Name: Judy Reynaga  MRN: [id number]  [facility]: [id number]  YOB: 1950    Diagnosis: B. RENAL STONES, L. URETERAL CALCULI, LEFT FLANK PAIN    Present Illness: B.  RENAL STONES, L. URETERAL CALCULI, LEFT FLAN heartbeats\",diagnosed many years ago at [facility]   • Calculus of kidney    • Cancer [facility]) 1995    thyroid   • Exposure to medical diagnostic radiation     radioactive treatment after thyroid surgery   • History of blood transfusion    • [facility] and alternatives with the patient/family. They understand and agree to proceed with plan of care. [ x ] I have reviewed the History and Physical done within the last 30 days. Any changes noted above.     PLAN: CYSTO, L. RPG, L. URETEROSCOPY WITH POSSIBLE

## 2019-06-26 NOTE — ANESTHESIA POSTPROCEDURE EVALUATION
[address] Patient Status:  Hospital Outpatient Surgery   Age/Gender 68year old male MRN [id number]   Location [facility] Attending Aarti Davis MD   Hosp Day # 0 PCP [facility]       Anesthesia Po

## 2019-06-26 NOTE — BRIEF OP NOTE
Pre-Operative Diagnosis: B. RENAL CALCULI, L. URETERAL CALCULI, LEFT FLANK PAIN]     Post-Operative Diagnosis: B.  RENAL CALCULI, L. URETERAL CALCULI ( 1.3 CM IMPACTED), LEFT FLANK PAIN, LARGE BPH WITH MEDIAN LOBE     Procedure Performed:   Procedure(s):  C

## 2019-06-28 NOTE — OPERATIVE REPORT
[facility]    PATIENT'S NAME: Kim Tatiana   ATTENDING PHYSICIAN: Hazel Wallace M.D. OPERATING PHYSICIAN: Hazel Wallace M.D.    PATIENT ACCOUNT#:   [de-identified]    [facility]  MEDICAL RECORD #:   [id number]       DATE OF stones into the intrarenal collecting system seen under fluoroscopic guidance. The open-ended catheter was then withdrawn as well as the cystoscope. A ureteral dilator was then used to dilate the ureter successfully.   The dilator was then withdrawn and a

## 2019-07-03 PROBLEM — N20.0 KIDNEY STONE ON LEFT SIDE: Status: ACTIVE | Noted: 2019-07-03

## 2019-08-07 ENCOUNTER — OFFICE VISIT (OUTPATIENT)
Dept: DERMATOLOGY | Age: 69
End: 2019-08-07

## 2019-08-07 DIAGNOSIS — L57.0 KERATOSIS, ACTINIC: ICD-10-CM

## 2019-08-07 DIAGNOSIS — C44.41 BASAL CELL CARCINOMA OF NECK: Primary | ICD-10-CM

## 2019-08-07 PROCEDURE — 99214 OFFICE O/P EST MOD 30 MIN: CPT | Performed by: DERMATOLOGY

## 2019-08-07 PROCEDURE — 11102 TANGNTL BX SKIN SINGLE LES: CPT | Performed by: DERMATOLOGY

## 2019-08-07 RX ORDER — LATANOPROST 50 UG/ML
1 SOLUTION/ DROPS OPHTHALMIC
COMMUNITY
Start: 2014-04-22

## 2019-08-07 RX ORDER — ALFUZOSIN HYDROCHLORIDE 10 MG/1
10 TABLET, EXTENDED RELEASE ORAL
COMMUNITY
Start: 2017-09-06

## 2019-08-07 RX ORDER — DILTIAZEM HYDROCHLORIDE 300 MG/1
CAPSULE, EXTENDED RELEASE ORAL
COMMUNITY
Start: 2016-10-16

## 2019-08-07 RX ORDER — FLUOROURACIL 50 MG/G
CREAM TOPICAL 2 TIMES DAILY
Qty: 40 G | Refills: 0 | Status: SHIPPED | OUTPATIENT
Start: 2019-08-07

## 2019-08-07 RX ORDER — HYDROCODONE BITARTRATE AND ACETAMINOPHEN 5; 325 MG/1; MG/1
TABLET ORAL
Refills: 0 | COMMUNITY
Start: 2019-06-26 | End: 2022-08-24 | Stop reason: ALTCHOICE

## 2019-08-07 RX ORDER — BENZONATATE 100 MG/1
100 CAPSULE ORAL
COMMUNITY
Start: 2018-11-11 | End: 2022-08-24 | Stop reason: ALTCHOICE

## 2019-08-07 RX ORDER — LEVOTHYROXINE SODIUM 0.1 MG/1
100 TABLET ORAL
COMMUNITY
Start: 2014-11-04 | End: 2022-08-24 | Stop reason: ALTCHOICE

## 2019-08-13 LAB — PATH REPORT PLASRBC-IMP: NORMAL

## 2019-09-04 ENCOUNTER — OFFICE VISIT (OUTPATIENT)
Dept: DERMATOLOGY | Age: 69
End: 2019-09-04

## 2019-09-04 DIAGNOSIS — C44.41 BASAL CELL CARCINOMA OF NECK: Primary | ICD-10-CM

## 2019-09-04 PROCEDURE — 17272 DSTR MAL LES S/N/H/F/G 1.1-2: CPT | Performed by: DERMATOLOGY

## 2020-03-03 ENCOUNTER — OFFICE VISIT (OUTPATIENT)
Dept: FAMILY MEDICINE CLINIC | Facility: CLINIC | Age: 70
End: 2020-03-03
Payer: MEDICARE

## 2020-03-03 VITALS
BODY MASS INDEX: 27.77 KG/M2 | RESPIRATION RATE: 22 BRPM | SYSTOLIC BLOOD PRESSURE: 124 MMHG | DIASTOLIC BLOOD PRESSURE: 80 MMHG | WEIGHT: 205 LBS | HEIGHT: 72 IN | TEMPERATURE: 98 F | OXYGEN SATURATION: 96 % | HEART RATE: 80 BPM

## 2020-03-03 DIAGNOSIS — J06.9 VIRAL URI WITH COUGH: Primary | ICD-10-CM

## 2020-03-03 PROCEDURE — 99213 OFFICE O/P EST LOW 20 MIN: CPT | Performed by: NURSE PRACTITIONER

## 2020-03-03 RX ORDER — HYDROCODONE BITARTRATE AND ACETAMINOPHEN 5; 325 MG/1; MG/1
TABLET ORAL
COMMUNITY
Start: 2014-08-02

## 2020-03-03 NOTE — PROGRESS NOTES
CHIEF COMPLAINT:   Patient presents with:  Cough: Started last night   Nasal Congestion: Stuffy/runny nose, sinus pressure, X 4 days      HPI:   Edgardo Ontiveros is a 68year old male who presents for upper respiratory symptoms for  3 days.  Patient reports r • ARTHROSCOPY OF JOINT UNLISTED     • CATARACT     • CYSTOSCOPY URETEROSCOPY Left 6/25/2019    Performed by Polina Ocampo MD at [facility] MAIN OR   • ESWL LITHOTRIPSY WITH CYSTOSCOPY, STENT PLACEMENT Right 5/23/2017    Performed by Polina Ocampo MD at [facility] FAHAD THROAT: Oral mucosa pink, moist. Posterior pharynx is non erythematous. no exudates. Tonsils 1/4. NECK: Supple, non-tender  LUNGS: clear to auscultation bilaterally, no wheezes or rhonchi. Breathing is non labored.  No cough during exam.   CARDIO: RRR wi · You may use acetaminophen or ibuprofen to control pain and fever, unless another medicine was prescribed. If you have chronic liver or kidney disease, have ever had a stomach ulcer or gastrointestinal bleeding, or are taking blood-thinning medicines, jamia © 7191-4434 The [address]. [address] [facility], [address] [place]. All rights reserved. This information is not intended as a substitute for professional medical care. Always follow your healthcare professional's instructions.             The

## 2020-11-02 PROBLEM — N40.1 ENLARGED PROSTATE WITH LOWER URINARY TRACT SYMPTOMS (LUTS): Status: ACTIVE | Noted: 2020-11-02

## 2021-08-18 ENCOUNTER — APPOINTMENT (OUTPATIENT)
Dept: DERMATOLOGY | Age: 71
End: 2021-08-18

## 2021-09-28 ENCOUNTER — OFFICE VISIT (OUTPATIENT)
Dept: DERMATOLOGY | Age: 71
End: 2021-09-28

## 2021-09-28 DIAGNOSIS — L57.0 KERATOSIS, ACTINIC: ICD-10-CM

## 2021-09-28 DIAGNOSIS — L82.1 SEBORRHEIC KERATOSIS: Primary | ICD-10-CM

## 2021-09-28 PROCEDURE — 11310 SHAVE SKIN LESION 0.5 CM/<: CPT | Performed by: DERMATOLOGY

## 2021-09-28 PROCEDURE — 11307 SHAVE SKIN LESION 1.1-2.0 CM: CPT | Performed by: DERMATOLOGY

## 2021-09-28 PROCEDURE — 17003 DESTRUCT PREMALG LES 2-14: CPT | Performed by: DERMATOLOGY

## 2021-09-28 PROCEDURE — 17000 DESTRUCT PREMALG LESION: CPT | Performed by: DERMATOLOGY

## 2021-09-28 PROCEDURE — 99213 OFFICE O/P EST LOW 20 MIN: CPT | Performed by: DERMATOLOGY

## 2021-09-30 LAB — PATH REPORT PLASRBC-IMP: NORMAL

## 2022-08-24 ENCOUNTER — OFFICE VISIT (OUTPATIENT)
Dept: DERMATOLOGY | Age: 72
End: 2022-08-24

## 2022-08-24 DIAGNOSIS — L57.0 KERATOSIS, ACTINIC: ICD-10-CM

## 2022-08-24 DIAGNOSIS — L21.9 SEBORRHEIC DERMATITIS: ICD-10-CM

## 2022-08-24 DIAGNOSIS — L82.1 SEBORRHEIC KERATOSIS: Primary | ICD-10-CM

## 2022-08-24 PROCEDURE — 17000 DESTRUCT PREMALG LESION: CPT | Performed by: DERMATOLOGY

## 2022-08-24 PROCEDURE — 99214 OFFICE O/P EST MOD 30 MIN: CPT | Performed by: DERMATOLOGY

## 2022-08-24 PROCEDURE — 11311 SHAVE SKIN LESION 0.6-1.0 CM: CPT | Performed by: DERMATOLOGY

## 2022-08-24 RX ORDER — BRINZOLAMIDE 10 MG/ML
SUSPENSION/ DROPS OPHTHALMIC
COMMUNITY
Start: 2022-08-17

## 2022-08-24 RX ORDER — IBUPROFEN 800 MG/1
TABLET ORAL
COMMUNITY
Start: 2022-08-22

## 2022-08-24 RX ORDER — LEVOTHYROXINE SODIUM 88 UG/1
TABLET ORAL
COMMUNITY
Start: 2022-06-17

## 2022-08-24 RX ORDER — LATANOPROST 50 UG/ML
1 SOLUTION/ DROPS OPHTHALMIC NIGHTLY
COMMUNITY

## 2022-08-24 RX ORDER — POTASSIUM CITRATE 15 MEQ/1
TABLET, EXTENDED RELEASE ORAL
COMMUNITY
Start: 2022-04-25

## 2022-08-24 RX ORDER — CHLORHEXIDINE GLUCONATE ORAL RINSE 1.2 MG/ML
SOLUTION DENTAL
COMMUNITY
Start: 2022-08-22

## 2022-08-24 RX ORDER — OMEPRAZOLE 20 MG/1
CAPSULE, DELAYED RELEASE ORAL
COMMUNITY
Start: 2022-08-18

## 2022-08-30 LAB — PATH REPORT PLASRBC-IMP: NORMAL

## 2022-09-01 ENCOUNTER — TELEPHONE (OUTPATIENT)
Dept: DERMATOLOGY | Age: 72
End: 2022-09-01

## 2022-11-09 ENCOUNTER — OFFICE VISIT (OUTPATIENT)
Dept: DERMATOLOGY | Age: 72
End: 2022-11-09

## 2022-11-09 DIAGNOSIS — L57.0 KERATOSIS, ACTINIC: ICD-10-CM

## 2022-11-09 DIAGNOSIS — L71.9 ROSACEA: Primary | ICD-10-CM

## 2022-11-09 DIAGNOSIS — L30.8 ASTEATOTIC DERMATITIS: ICD-10-CM

## 2022-11-09 PROCEDURE — 17000 DESTRUCT PREMALG LESION: CPT | Performed by: DERMATOLOGY

## 2022-11-09 PROCEDURE — 99213 OFFICE O/P EST LOW 20 MIN: CPT | Performed by: DERMATOLOGY

## 2022-11-09 RX ORDER — BUDESONIDE 0.5 MG/2ML
0.5 INHALANT ORAL
COMMUNITY
Start: 2022-10-06

## 2022-11-09 RX ORDER — ARFORMOTEROL TARTRATE 15 UG/2ML
2 SOLUTION RESPIRATORY (INHALATION)
COMMUNITY
Start: 2022-10-06

## 2022-11-09 RX ORDER — TRIAMCINOLONE ACETONIDE 1 MG/G
OINTMENT TOPICAL
Qty: 15 G | Refills: 1 | Status: SHIPPED | OUTPATIENT
Start: 2022-11-09

## 2023-04-13 RX ORDER — FINASTERIDE 5 MG/1
5 TABLET, FILM COATED ORAL DAILY
COMMUNITY

## 2023-04-19 ENCOUNTER — HOSPITAL ENCOUNTER (OUTPATIENT)
Facility: HOSPITAL | Age: 73
Setting detail: HOSPITAL OUTPATIENT SURGERY
Discharge: HOME | End: 2023-04-19
Attending: UROLOGY | Admitting: UROLOGY
Payer: MEDICARE

## 2023-04-19 ENCOUNTER — APPOINTMENT (OUTPATIENT)
Dept: GENERAL RADIOLOGY | Facility: HOSPITAL | Age: 73
End: 2023-04-19
Attending: UROLOGY
Payer: MEDICARE

## 2023-04-19 ENCOUNTER — ANESTHESIA EVENT (OUTPATIENT)
Dept: SURGERY | Facility: HOSPITAL | Age: 73
End: 2023-04-19
Payer: MEDICARE

## 2023-04-19 ENCOUNTER — ANESTHESIA (OUTPATIENT)
Dept: SURGERY | Facility: HOSPITAL | Age: 73
End: 2023-04-19
Payer: MEDICARE

## 2023-04-19 VITALS
SYSTOLIC BLOOD PRESSURE: 112 MMHG | OXYGEN SATURATION: 96 % | DIASTOLIC BLOOD PRESSURE: 69 MMHG | BODY MASS INDEX: 27.77 KG/M2 | HEART RATE: 64 BPM | HEIGHT: 72 IN | RESPIRATION RATE: 12 BRPM | TEMPERATURE: 97 F | WEIGHT: 205 LBS

## 2023-04-19 PROCEDURE — 88300 SURGICAL PATH GROSS: CPT | Performed by: UROLOGY

## 2023-04-19 PROCEDURE — 0TCB8ZZ EXTIRPATION OF MATTER FROM BLADDER, VIA NATURAL OR ARTIFICIAL OPENING ENDOSCOPIC: ICD-10-PCS | Performed by: UROLOGY

## 2023-04-19 PROCEDURE — 82365 CALCULUS SPECTROSCOPY: CPT | Performed by: UROLOGY

## 2023-04-19 PROCEDURE — 0VT08ZZ RESECTION OF PROSTATE, VIA NATURAL OR ARTIFICIAL OPENING ENDOSCOPIC: ICD-10-PCS | Performed by: UROLOGY

## 2023-04-19 RX ORDER — PHENAZOPYRIDINE HYDROCHLORIDE 200 MG/1
200 TABLET, FILM COATED ORAL 3 TIMES DAILY PRN
Qty: 15 TABLET | Refills: 1 | Status: SHIPPED | OUTPATIENT
Start: 2023-04-19 | End: 2023-05-04

## 2023-04-19 RX ORDER — HYDROMORPHONE HYDROCHLORIDE 1 MG/ML
INJECTION, SOLUTION INTRAMUSCULAR; INTRAVENOUS; SUBCUTANEOUS
Status: COMPLETED
Start: 2023-04-19 | End: 2023-04-19

## 2023-04-19 RX ORDER — ACETAMINOPHEN 500 MG
1000 TABLET ORAL ONCE
Status: DISCONTINUED | OUTPATIENT
Start: 2023-04-19 | End: 2023-04-19 | Stop reason: HOSPADM

## 2023-04-19 RX ORDER — METOCLOPRAMIDE HYDROCHLORIDE 5 MG/ML
INJECTION INTRAMUSCULAR; INTRAVENOUS AS NEEDED
Status: DISCONTINUED | OUTPATIENT
Start: 2023-04-19 | End: 2023-04-19 | Stop reason: SURG

## 2023-04-19 RX ORDER — CEFAZOLIN SODIUM/WATER 2 G/20 ML
SYRINGE (ML) INTRAVENOUS
Status: DISCONTINUED
Start: 2023-04-19 | End: 2023-04-19

## 2023-04-19 RX ORDER — NALOXONE HYDROCHLORIDE 0.4 MG/ML
80 INJECTION, SOLUTION INTRAMUSCULAR; INTRAVENOUS; SUBCUTANEOUS AS NEEDED
Status: DISCONTINUED | OUTPATIENT
Start: 2023-04-19 | End: 2023-04-19

## 2023-04-19 RX ORDER — MEPERIDINE HYDROCHLORIDE 25 MG/ML
12.5 INJECTION INTRAMUSCULAR; INTRAVENOUS; SUBCUTANEOUS AS NEEDED
Status: DISCONTINUED | OUTPATIENT
Start: 2023-04-19 | End: 2023-04-19

## 2023-04-19 RX ORDER — EPHEDRINE SULFATE 50 MG/ML
INJECTION INTRAVENOUS AS NEEDED
Status: DISCONTINUED | OUTPATIENT
Start: 2023-04-19 | End: 2023-04-19 | Stop reason: SURG

## 2023-04-19 RX ORDER — HYDROCODONE BITARTRATE AND ACETAMINOPHEN 5; 325 MG/1; MG/1
2 TABLET ORAL ONCE AS NEEDED
Status: DISCONTINUED | OUTPATIENT
Start: 2023-04-19 | End: 2023-04-19

## 2023-04-19 RX ORDER — DIPHENHYDRAMINE HYDROCHLORIDE 50 MG/ML
12.5 INJECTION INTRAMUSCULAR; INTRAVENOUS AS NEEDED
Status: DISCONTINUED | OUTPATIENT
Start: 2023-04-19 | End: 2023-04-19

## 2023-04-19 RX ORDER — HYDROMORPHONE HYDROCHLORIDE 1 MG/ML
0.2 INJECTION, SOLUTION INTRAMUSCULAR; INTRAVENOUS; SUBCUTANEOUS EVERY 5 MIN PRN
Status: DISCONTINUED | OUTPATIENT
Start: 2023-04-19 | End: 2023-04-19

## 2023-04-19 RX ORDER — HYDROCODONE BITARTRATE AND ACETAMINOPHEN 5; 325 MG/1; MG/1
1 TABLET ORAL ONCE AS NEEDED
Status: DISCONTINUED | OUTPATIENT
Start: 2023-04-19 | End: 2023-04-19

## 2023-04-19 RX ORDER — ACETAMINOPHEN 500 MG
1000 TABLET ORAL ONCE AS NEEDED
Status: DISCONTINUED | OUTPATIENT
Start: 2023-04-19 | End: 2023-04-19

## 2023-04-19 RX ORDER — ONDANSETRON 2 MG/ML
4 INJECTION INTRAMUSCULAR; INTRAVENOUS EVERY 6 HOURS PRN
Status: DISCONTINUED | OUTPATIENT
Start: 2023-04-19 | End: 2023-04-19

## 2023-04-19 RX ORDER — BISACODYL 5 MG/1
5 TABLET, DELAYED RELEASE ORAL
Qty: 20 TABLET | Refills: 1 | Status: SHIPPED | OUTPATIENT
Start: 2023-04-19

## 2023-04-19 RX ORDER — DEXAMETHASONE SODIUM PHOSPHATE 4 MG/ML
VIAL (ML) INJECTION AS NEEDED
Status: DISCONTINUED | OUTPATIENT
Start: 2023-04-19 | End: 2023-04-19 | Stop reason: SURG

## 2023-04-19 RX ORDER — SODIUM CHLORIDE, SODIUM LACTATE, POTASSIUM CHLORIDE, CALCIUM CHLORIDE 600; 310; 30; 20 MG/100ML; MG/100ML; MG/100ML; MG/100ML
INJECTION, SOLUTION INTRAVENOUS CONTINUOUS
Status: DISCONTINUED | OUTPATIENT
Start: 2023-04-19 | End: 2023-04-19

## 2023-04-19 RX ORDER — LABETALOL HYDROCHLORIDE 5 MG/ML
5 INJECTION, SOLUTION INTRAVENOUS EVERY 5 MIN PRN
Status: DISCONTINUED | OUTPATIENT
Start: 2023-04-19 | End: 2023-04-19

## 2023-04-19 RX ORDER — CEFAZOLIN SODIUM/WATER 2 G/20 ML
2 SYRINGE (ML) INTRAVENOUS ONCE
Status: COMPLETED | OUTPATIENT
Start: 2023-04-19 | End: 2023-04-19

## 2023-04-19 RX ORDER — BISACODYL 5 MG/1
5 TABLET, DELAYED RELEASE ORAL 2 TIMES DAILY
Qty: 20 TABLET | Refills: 1 | Status: SHIPPED | OUTPATIENT
Start: 2023-04-19

## 2023-04-19 RX ORDER — LIDOCAINE HYDROCHLORIDE 10 MG/ML
INJECTION, SOLUTION EPIDURAL; INFILTRATION; INTRACAUDAL; PERINEURAL AS NEEDED
Status: DISCONTINUED | OUTPATIENT
Start: 2023-04-19 | End: 2023-04-19 | Stop reason: SURG

## 2023-04-19 RX ORDER — HYDROMORPHONE HYDROCHLORIDE 1 MG/ML
0.4 INJECTION, SOLUTION INTRAMUSCULAR; INTRAVENOUS; SUBCUTANEOUS EVERY 5 MIN PRN
Status: DISCONTINUED | OUTPATIENT
Start: 2023-04-19 | End: 2023-04-19

## 2023-04-19 RX ORDER — ONDANSETRON 2 MG/ML
INJECTION INTRAMUSCULAR; INTRAVENOUS AS NEEDED
Status: DISCONTINUED | OUTPATIENT
Start: 2023-04-19 | End: 2023-04-19 | Stop reason: SURG

## 2023-04-19 RX ORDER — LIDOCAINE HYDROCHLORIDE 20 MG/ML
JELLY TOPICAL AS NEEDED
Status: DISCONTINUED | OUTPATIENT
Start: 2023-04-19 | End: 2023-04-19 | Stop reason: HOSPADM

## 2023-04-19 RX ORDER — METOCLOPRAMIDE HYDROCHLORIDE 5 MG/ML
10 INJECTION INTRAMUSCULAR; INTRAVENOUS EVERY 8 HOURS PRN
Status: DISCONTINUED | OUTPATIENT
Start: 2023-04-19 | End: 2023-04-19

## 2023-04-19 RX ORDER — HYDROMORPHONE HYDROCHLORIDE 1 MG/ML
0.6 INJECTION, SOLUTION INTRAMUSCULAR; INTRAVENOUS; SUBCUTANEOUS EVERY 5 MIN PRN
Status: DISCONTINUED | OUTPATIENT
Start: 2023-04-19 | End: 2023-04-19

## 2023-04-19 RX ORDER — ALBUTEROL SULFATE 2.5 MG/3ML
2.5 SOLUTION RESPIRATORY (INHALATION) AS NEEDED
Status: DISCONTINUED | OUTPATIENT
Start: 2023-04-19 | End: 2023-04-19

## 2023-04-19 RX ADMIN — LIDOCAINE HYDROCHLORIDE 50 MG: 10 INJECTION, SOLUTION EPIDURAL; INFILTRATION; INTRACAUDAL; PERINEURAL at 08:38:00

## 2023-04-19 RX ADMIN — EPHEDRINE SULFATE 10 MG: 50 INJECTION INTRAVENOUS at 08:58:00

## 2023-04-19 RX ADMIN — EPHEDRINE SULFATE 5 MG: 50 INJECTION INTRAVENOUS at 08:50:00

## 2023-04-19 RX ADMIN — CEFAZOLIN SODIUM/WATER 2 G: 2 G/20 ML SYRINGE (ML) INTRAVENOUS at 08:44:00

## 2023-04-19 RX ADMIN — SODIUM CHLORIDE, SODIUM LACTATE, POTASSIUM CHLORIDE, CALCIUM CHLORIDE: 600; 310; 30; 20 INJECTION, SOLUTION INTRAVENOUS at 09:42:00

## 2023-04-19 RX ADMIN — ONDANSETRON 4 MG: 2 INJECTION INTRAMUSCULAR; INTRAVENOUS at 09:41:00

## 2023-04-19 RX ADMIN — DEXAMETHASONE SODIUM PHOSPHATE 4 MG: 4 MG/ML VIAL (ML) INJECTION at 08:38:00

## 2023-04-19 RX ADMIN — SODIUM CHLORIDE, SODIUM LACTATE, POTASSIUM CHLORIDE, CALCIUM CHLORIDE: 600; 310; 30; 20 INJECTION, SOLUTION INTRAVENOUS at 08:34:00

## 2023-04-19 RX ADMIN — METOCLOPRAMIDE HYDROCHLORIDE 10 MG: 5 INJECTION INTRAMUSCULAR; INTRAVENOUS at 08:38:00

## 2023-04-19 NOTE — DISCHARGE INSTRUCTIONS
PAIN MANAGEMENT:  Please take Tylenol 500 mg 2 capsules or tabs Q6 hours (suggest ~ 12 and 6 o' clock give or take a few hours)    Please take 2 Aleve @ 9AM and 9PM with meals OR Ibuprofen 400 mg at 3 and 9 o\"clock both AM and PM (give or take a few hours)  Take a stool softner to prevent constipation suggest Dulcolax 2 tabs at 9 am and 9 pm

## 2023-04-19 NOTE — BRIEF OP NOTE
Pre-Operative Diagnosis: BENIGN PROSTATIC HYPERPLASIA WITH OBSTRUCTION, bladder stones     Post-Operative Diagnosis:  BENIGN PROSTATIC HYPERPLASIA WITH OBSTRUCTION, bladder stones     Procedure Performed:   CYSTOSCOPY TRANSURETHRAL RESECTION PROSTATE.  HOLMIUM LASER LITHOTRIPSY OF BLADDER STONES    Surgeon(s) and Role:     Oly Rojas MD - Primary    Assistant(s):        Surgical Findings: successful procedure     Specimen: bladder stones     Estimated Blood Loss: 5 cc    Evy Hairston MD  4/19/2023  4:16 PM

## 2023-04-19 NOTE — ANESTHESIA PROCEDURE NOTES
Airway  Date/Time: 4/19/2023 8:41 AM  Urgency: elective    Airway not difficult    General Information and Staff    Patient location during procedure: OR  Anesthesiologist: Bryanna Dozier MD  Resident/CRNA: Puma Mercedes CRNA  Performed: CRNA   Performed by: Puma Mercedes CRNA  Authorized by: Bryanna Dozier MD      Indications and Patient Condition  Indications for airway management: anesthesia  Spontaneous Ventilation: absent  Sedation level: deep  Preoxygenated: yes  Patient position: sniffing  Mask difficulty assessment: 1 - vent by mask    Final Airway Details  Final airway type: supraglottic airway      Successful airway: classic  Size 4       Number of attempts at approach: 1

## 2023-04-20 NOTE — OPERATIVE REPORT
[facility]    PATIENT'S NAME: Brenda Crane   ATTENDING PHYSICIAN: Luz Marina Perdue M.D. OPERATING PHYSICIAN: Luz Marina Perdue M.D. PATIENT ACCOUNT#:   [de-identified]    LOCATION:  [facility] 17 EDWP 10  MEDICAL RECORD #:   [id number]       YOB: 1950  ADMISSION DATE:       04/19/2023      OPERATION DATE:  04/19/2023    OPERATIVE REPORT    PREOPERATIVE DIAGNOSIS:  Benign prostatic hypertrophy with obstruction, bladder calculi (3 cm). POSTOPERATIVE DIAGNOSIS:  PROCEDURE:  Cystoscopy, laser ablation of bladder calculi (3 cm), bipolar transurethral resection of the prostate (TURP). ANESTHESIA:  General.    INDICATIONS:  Patient is a 38-year-old male who presents with large bladder calculi and BPH with obstruction for elective intervention with laser lithotripsy for removal of stones and then TURP as a desired/preferred procedure of choice for BPH with obstruction. OPERATIVE TECHNIQUE:  Patient was prepped and draped in sterile fashion after being placed in the dorsal lithotomy position after undergoing successful administration of general anesthesia. A 21-Zambian cystoscope was advanced through the urethra into the prostatic fossa, noting elevated median bar and latera lobe obstruction. Once within the bladder, the 3 cm blader calculus was seen. The 1000 micron laser was used to segment the stone nicely, and then the fragments were removed successfully. The cystoscope was then withdrawn and the bipolar resectoscope was placed. The bladder neck and mid-gland was then nicely vaporized, excised, and removed using the button. The vaporization was started at the median bar and medium lobe, followed by the left lobe from the 1-o'clock to 5-o'clock position and then the right lobe from the 11-o'clock to 7-o'clock position. We preserved the apex of the prostate to optimize potential for ejaculatory function.   With a good result seen, a 20-Zambian 3-way Spaulding catheter was placed and the patient was then transferred to the recovery room in good condition. The irrigant clarity was light pink and thus, sent the patient to the recovery room. ESTIMATED BLOOD LOSS FROM PROCEDURE:  10 mL. COMPLICATIONS:  None. CONDITION:  Good. DRAINS:  20-Belarusian 3-way Spaulding catheter. SPECIMENS:  Bladder calculi.     Dictated By Kevan Chance M.D.  d: 04/19/2023 16:30:40  t: 04/19/2023 19:44:53  [facility] [id number]  ZT/

## 2023-04-25 LAB
CAOX DIHYDRATE: 50 %
CAOX MONOHYDRATE: 40 %
URIC ACID-STONE: 10 %
WEIGHT-STONE: 491 MG

## 2023-10-04 ENCOUNTER — OFFICE VISIT (OUTPATIENT)
Dept: DERMATOLOGY | Age: 73
End: 2023-10-04

## 2023-10-04 DIAGNOSIS — L30.9 DERMATITIS: Primary | ICD-10-CM

## 2023-10-04 DIAGNOSIS — L82.1 SEBORRHEIC KERATOSIS: ICD-10-CM

## 2023-10-04 PROCEDURE — 99213 OFFICE O/P EST LOW 20 MIN: CPT | Performed by: DERMATOLOGY

## 2023-10-04 RX ORDER — ALCLOMETASONE DIPROPIONATE 0.5 MG/G
OINTMENT TOPICAL 2 TIMES DAILY
Qty: 30 G | Refills: 1 | Status: SHIPPED | OUTPATIENT
Start: 2023-10-04

## 2024-08-14 ENCOUNTER — APPOINTMENT (OUTPATIENT)
Dept: DERMATOLOGY | Age: 74
End: 2024-08-14

## 2024-08-14 DIAGNOSIS — L82.0 SEBORRHEIC KERATOSIS, INFLAMED: Primary | ICD-10-CM

## 2024-08-14 DIAGNOSIS — L82.1 SEBORRHEIC KERATOSIS: ICD-10-CM

## 2024-08-19 LAB
ASR DISCLAIMER: NORMAL
CASE RPRT: NORMAL
CLINICAL INFO: NORMAL
PATH REPORT.FINAL DX SPEC: NORMAL
PATH REPORT.GROSS SPEC: NORMAL

## 2024-08-20 ENCOUNTER — TELEPHONE (OUTPATIENT)
Dept: DERMATOLOGY | Age: 74
End: 2024-08-20

## 2025-01-15 ENCOUNTER — HOSPITAL ENCOUNTER (EMERGENCY)
Facility: HOSPITAL | Age: 75
Discharge: HOME | End: 2025-01-15
Attending: EMERGENCY MEDICINE
Payer: MEDICARE

## 2025-01-15 ENCOUNTER — APPOINTMENT (OUTPATIENT)
Dept: CT IMAGING | Age: 75
End: 2025-01-15
Attending: EMERGENCY MEDICINE
Payer: MEDICARE

## 2025-01-15 ENCOUNTER — ANESTHESIA EVENT (OUTPATIENT)
Dept: SURGERY | Facility: HOSPITAL | Age: 75
End: 2025-01-15
Payer: MEDICARE

## 2025-01-15 ENCOUNTER — ANESTHESIA (OUTPATIENT)
Dept: SURGERY | Facility: HOSPITAL | Age: 75
End: 2025-01-15
Payer: MEDICARE

## 2025-01-15 VITALS
RESPIRATION RATE: 16 BRPM | HEART RATE: 68 BPM | OXYGEN SATURATION: 96 % | WEIGHT: 200 LBS | HEIGHT: 72 IN | SYSTOLIC BLOOD PRESSURE: 129 MMHG | BODY MASS INDEX: 27.09 KG/M2 | TEMPERATURE: 97 F | DIASTOLIC BLOOD PRESSURE: 69 MMHG

## 2025-01-15 DIAGNOSIS — K81.0 ACUTE CHOLECYSTITIS: Primary | ICD-10-CM

## 2025-01-15 DIAGNOSIS — K80.20 CHOLELITHIASIS: ICD-10-CM

## 2025-01-15 DIAGNOSIS — G89.18 POSTOPERATIVE PAIN: ICD-10-CM

## 2025-01-15 PROBLEM — K80.00 CALCULUS OF GALLBLADDER WITH ACUTE CHOLECYSTITIS WITHOUT OBSTRUCTION: Status: ACTIVE | Noted: 2025-01-15

## 2025-01-15 PROBLEM — Z98.890 H/O EXPLORATORY LAPAROTOMY: Status: ACTIVE | Noted: 2025-01-15

## 2025-01-15 LAB
ALBUMIN SERPL-MCNC: 4.7 G/DL (ref 3.2–4.8)
ALBUMIN/GLOB SERPL: 1.5 {RATIO} (ref 1–2)
ALP LIVER SERPL-CCNC: 105 U/L
ALT SERPL-CCNC: 31 U/L
ANION GAP SERPL CALC-SCNC: 8 MMOL/L (ref 0–18)
AST SERPL-CCNC: 46 U/L (ref ?–34)
BASOPHILS # BLD AUTO: 0.07 X10(3) UL (ref 0–0.2)
BASOPHILS NFR BLD AUTO: 0.7 %
BILIRUB SERPL-MCNC: 0.6 MG/DL (ref 0.2–1.1)
BILIRUB UR QL STRIP.AUTO: NEGATIVE
BUN BLD-MCNC: 20 MG/DL (ref 7–18)
BUN BLD-MCNC: 23 MG/DL (ref 9–23)
CALCIUM BLD-MCNC: 10 MG/DL (ref 8.7–10.6)
CHLORIDE BLD-SCNC: 104 MMOL/L (ref 98–112)
CHLORIDE SERPL-SCNC: 103 MMOL/L (ref 98–112)
CLARITY UR REFRACT.AUTO: CLEAR
CO2 BLD-SCNC: 21 MMOL/L (ref 21–32)
CO2 SERPL-SCNC: 25 MMOL/L (ref 21–32)
COLOR UR AUTO: YELLOW
CREAT BLD-MCNC: 1 MG/DL
CREAT BLD-MCNC: 1.23 MG/DL
EGFRCR SERPLBLD CKD-EPI 2021: 61 ML/MIN/1.73M2 (ref 60–?)
EGFRCR SERPLBLD CKD-EPI 2021: 78 ML/MIN/1.73M2 (ref 60–?)
EOSINOPHIL # BLD AUTO: 0.16 X10(3) UL (ref 0–0.7)
EOSINOPHIL NFR BLD AUTO: 1.6 %
ERYTHROCYTE [DISTWIDTH] IN BLOOD BY AUTOMATED COUNT: 13.1 %
GLOBULIN PLAS-MCNC: 3.1 G/DL (ref 2–3.5)
GLUCOSE BLD-MCNC: 138 MG/DL (ref 70–99)
GLUCOSE BLD-MCNC: 146 MG/DL (ref 70–99)
GLUCOSE UR STRIP.AUTO-MCNC: NEGATIVE MG/DL
HCT VFR BLD AUTO: 48 %
HCT VFR BLD CALC: 46 %
HGB BLD-MCNC: 16.5 G/DL
IMM GRANULOCYTES # BLD AUTO: 0.05 X10(3) UL (ref 0–1)
IMM GRANULOCYTES NFR BLD: 0.5 %
ISTAT IONIZED CALCIUM FOR CHEM 8: 1.08 MMOL/L (ref 1.12–1.32)
KETONES UR STRIP.AUTO-MCNC: NEGATIVE MG/DL
LEUKOCYTE ESTERASE UR QL STRIP.AUTO: NEGATIVE
LIPASE SERPL-CCNC: 32 U/L (ref 12–53)
LYMPHOCYTES # BLD AUTO: 1.35 X10(3) UL (ref 1–4)
LYMPHOCYTES NFR BLD AUTO: 13.6 %
MCH RBC QN AUTO: 30.8 PG (ref 26–34)
MCHC RBC AUTO-ENTMCNC: 34.4 G/DL (ref 31–37)
MCV RBC AUTO: 89.6 FL
MONOCYTES # BLD AUTO: 0.72 X10(3) UL (ref 0.1–1)
MONOCYTES NFR BLD AUTO: 7.3 %
NEUTROPHILS # BLD AUTO: 7.58 X10 (3) UL (ref 1.5–7.7)
NEUTROPHILS # BLD AUTO: 7.58 X10(3) UL (ref 1.5–7.7)
NEUTROPHILS NFR BLD AUTO: 76.3 %
NITRITE UR QL STRIP.AUTO: NEGATIVE
OSMOLALITY SERPL CALC.SUM OF ELEC: 288 MOSM/KG (ref 275–295)
PH UR STRIP.AUTO: 6 [PH] (ref 5–8)
PLATELET # BLD AUTO: 364 10(3)UL (ref 150–450)
POTASSIUM BLD-SCNC: 3.8 MMOL/L (ref 3.6–5.1)
POTASSIUM SERPL-SCNC: 3.7 MMOL/L (ref 3.5–5.1)
PROT SERPL-MCNC: 7.8 G/DL (ref 5.7–8.2)
PROT UR STRIP.AUTO-MCNC: NEGATIVE MG/DL
RBC # BLD AUTO: 5.36 X10(6)UL
RBC UR QL AUTO: NEGATIVE
SODIUM BLD-SCNC: 138 MMOL/L (ref 136–145)
SODIUM SERPL-SCNC: 136 MMOL/L (ref 136–145)
SP GR UR STRIP.AUTO: 1.02 (ref 1–1.03)
UROBILINOGEN UR STRIP.AUTO-MCNC: 0.2 MG/DL
WBC # BLD AUTO: 9.9 X10(3) UL (ref 4–11)

## 2025-01-15 PROCEDURE — BF522Z0 OTHER IMAGING OF GALLBLADDER USING FLUORESCING AGENT, INTRAOPERATIVE: ICD-10-PCS | Performed by: SURGERY

## 2025-01-15 PROCEDURE — 47562 LAPAROSCOPIC CHOLECYSTECTOMY: CPT | Performed by: SURGERY

## 2025-01-15 PROCEDURE — 0DNU4ZZ RELEASE OMENTUM, PERCUTANEOUS ENDOSCOPIC APPROACH: ICD-10-PCS | Performed by: SURGERY

## 2025-01-15 PROCEDURE — 99222 1ST HOSP IP/OBS MODERATE 55: CPT | Performed by: SURGERY

## 2025-01-15 PROCEDURE — 0FT44ZZ RESECTION OF GALLBLADDER, PERCUTANEOUS ENDOSCOPIC APPROACH: ICD-10-PCS | Performed by: SURGERY

## 2025-01-15 PROCEDURE — 47562 LAPAROSCOPIC CHOLECYSTECTOMY: CPT

## 2025-01-15 PROCEDURE — 74176 CT ABD & PELVIS W/O CONTRAST: CPT | Performed by: EMERGENCY MEDICINE

## 2025-01-15 RX ORDER — INDOCYANINE GREEN AND WATER 25 MG
KIT INJECTION AS NEEDED
Status: DISCONTINUED | OUTPATIENT
Start: 2025-01-15 | End: 2025-01-15 | Stop reason: HOSPADM

## 2025-01-15 RX ORDER — INDOCYANINE GREEN AND WATER 25 MG
5 KIT INJECTION ONCE
Status: DISCONTINUED | OUTPATIENT
Start: 2025-01-15 | End: 2025-01-15

## 2025-01-15 RX ORDER — DEXAMETHASONE SODIUM PHOSPHATE 4 MG/ML
VIAL (ML) INJECTION AS NEEDED
Status: DISCONTINUED | OUTPATIENT
Start: 2025-01-15 | End: 2025-01-15 | Stop reason: SURG

## 2025-01-15 RX ORDER — CEFAZOLIN SODIUM 1 G/3ML
INJECTION, POWDER, FOR SOLUTION INTRAMUSCULAR; INTRAVENOUS AS NEEDED
Status: DISCONTINUED | OUTPATIENT
Start: 2025-01-15 | End: 2025-01-15 | Stop reason: SURG

## 2025-01-15 RX ORDER — METOCLOPRAMIDE HYDROCHLORIDE 5 MG/ML
5 INJECTION INTRAMUSCULAR; INTRAVENOUS EVERY 8 HOURS PRN
Status: DISCONTINUED | OUTPATIENT
Start: 2025-01-15 | End: 2025-01-15

## 2025-01-15 RX ORDER — HYDROCODONE BITARTRATE AND ACETAMINOPHEN 5; 325 MG/1; MG/1
1 TABLET ORAL EVERY 6 HOURS PRN
Qty: 15 TABLET | Refills: 0 | Status: SHIPPED | OUTPATIENT
Start: 2025-01-15

## 2025-01-15 RX ORDER — HYDROMORPHONE HYDROCHLORIDE 1 MG/ML
0.2 INJECTION, SOLUTION INTRAMUSCULAR; INTRAVENOUS; SUBCUTANEOUS EVERY 5 MIN PRN
Status: DISCONTINUED | OUTPATIENT
Start: 2025-01-15 | End: 2025-01-15

## 2025-01-15 RX ORDER — BUPIVACAINE HYDROCHLORIDE AND EPINEPHRINE 5; 5 MG/ML; UG/ML
INJECTION, SOLUTION EPIDURAL; INTRACAUDAL; PERINEURAL AS NEEDED
Status: DISCONTINUED | OUTPATIENT
Start: 2025-01-15 | End: 2025-01-15 | Stop reason: HOSPADM

## 2025-01-15 RX ORDER — HYDROMORPHONE HYDROCHLORIDE 1 MG/ML
0.6 INJECTION, SOLUTION INTRAMUSCULAR; INTRAVENOUS; SUBCUTANEOUS EVERY 5 MIN PRN
Status: DISCONTINUED | OUTPATIENT
Start: 2025-01-15 | End: 2025-01-15

## 2025-01-15 RX ORDER — HYDROCODONE BITARTRATE AND ACETAMINOPHEN 5; 325 MG/1; MG/1
1 TABLET ORAL ONCE AS NEEDED
Status: COMPLETED | OUTPATIENT
Start: 2025-01-15 | End: 2025-01-15

## 2025-01-15 RX ORDER — NALOXONE HYDROCHLORIDE 0.4 MG/ML
0.08 INJECTION, SOLUTION INTRAMUSCULAR; INTRAVENOUS; SUBCUTANEOUS AS NEEDED
Status: DISCONTINUED | OUTPATIENT
Start: 2025-01-15 | End: 2025-01-15

## 2025-01-15 RX ORDER — ROCURONIUM BROMIDE 10 MG/ML
INJECTION, SOLUTION INTRAVENOUS AS NEEDED
Status: DISCONTINUED | OUTPATIENT
Start: 2025-01-15 | End: 2025-01-15 | Stop reason: SURG

## 2025-01-15 RX ORDER — LIDOCAINE HYDROCHLORIDE 10 MG/ML
INJECTION, SOLUTION EPIDURAL; INFILTRATION; INTRACAUDAL; PERINEURAL AS NEEDED
Status: DISCONTINUED | OUTPATIENT
Start: 2025-01-15 | End: 2025-01-15 | Stop reason: SURG

## 2025-01-15 RX ORDER — SODIUM CHLORIDE, SODIUM LACTATE, POTASSIUM CHLORIDE, CALCIUM CHLORIDE 600; 310; 30; 20 MG/100ML; MG/100ML; MG/100ML; MG/100ML
INJECTION, SOLUTION INTRAVENOUS CONTINUOUS
Status: DISCONTINUED | OUTPATIENT
Start: 2025-01-15 | End: 2025-01-15

## 2025-01-15 RX ORDER — ACETAMINOPHEN 500 MG
1000 TABLET ORAL ONCE AS NEEDED
Status: COMPLETED | OUTPATIENT
Start: 2025-01-15 | End: 2025-01-15

## 2025-01-15 RX ORDER — EPHEDRINE SULFATE 50 MG/ML
INJECTION INTRAVENOUS AS NEEDED
Status: DISCONTINUED | OUTPATIENT
Start: 2025-01-15 | End: 2025-01-15 | Stop reason: SURG

## 2025-01-15 RX ORDER — ESMOLOL HYDROCHLORIDE 10 MG/ML
INJECTION INTRAVENOUS AS NEEDED
Status: DISCONTINUED | OUTPATIENT
Start: 2025-01-15 | End: 2025-01-15 | Stop reason: SURG

## 2025-01-15 RX ORDER — HYDROCODONE BITARTRATE AND ACETAMINOPHEN 5; 325 MG/1; MG/1
2 TABLET ORAL ONCE AS NEEDED
Status: COMPLETED | OUTPATIENT
Start: 2025-01-15 | End: 2025-01-15

## 2025-01-15 RX ORDER — ONDANSETRON 2 MG/ML
INJECTION INTRAMUSCULAR; INTRAVENOUS AS NEEDED
Status: DISCONTINUED | OUTPATIENT
Start: 2025-01-15 | End: 2025-01-15 | Stop reason: SURG

## 2025-01-15 RX ORDER — ONDANSETRON 2 MG/ML
4 INJECTION INTRAMUSCULAR; INTRAVENOUS EVERY 6 HOURS PRN
Status: DISCONTINUED | OUTPATIENT
Start: 2025-01-15 | End: 2025-01-15

## 2025-01-15 RX ORDER — PHENYLEPHRINE HCL 10 MG/ML
VIAL (ML) INJECTION AS NEEDED
Status: DISCONTINUED | OUTPATIENT
Start: 2025-01-15 | End: 2025-01-15 | Stop reason: SURG

## 2025-01-15 RX ORDER — HYDROMORPHONE HYDROCHLORIDE 1 MG/ML
0.4 INJECTION, SOLUTION INTRAMUSCULAR; INTRAVENOUS; SUBCUTANEOUS EVERY 5 MIN PRN
Status: DISCONTINUED | OUTPATIENT
Start: 2025-01-15 | End: 2025-01-15

## 2025-01-15 RX ORDER — SODIUM CHLORIDE, SODIUM LACTATE, POTASSIUM CHLORIDE, CALCIUM CHLORIDE 600; 310; 30; 20 MG/100ML; MG/100ML; MG/100ML; MG/100ML
INJECTION, SOLUTION INTRAVENOUS CONTINUOUS PRN
Status: DISCONTINUED | OUTPATIENT
Start: 2025-01-15 | End: 2025-01-15 | Stop reason: SURG

## 2025-01-15 RX ADMIN — ROCURONIUM BROMIDE 50 MG: 10 INJECTION, SOLUTION INTRAVENOUS at 13:13:00

## 2025-01-15 RX ADMIN — ESMOLOL HYDROCHLORIDE 90 MG: 10 INJECTION INTRAVENOUS at 13:08:00

## 2025-01-15 RX ADMIN — ONDANSETRON 4 MG: 2 INJECTION INTRAMUSCULAR; INTRAVENOUS at 15:52:00

## 2025-01-15 RX ADMIN — EPHEDRINE SULFATE 5 MG: 50 INJECTION INTRAVENOUS at 13:41:00

## 2025-01-15 RX ADMIN — ROCURONIUM BROMIDE 20 MG: 10 INJECTION, SOLUTION INTRAVENOUS at 14:48:00

## 2025-01-15 RX ADMIN — CEFAZOLIN SODIUM 2 G: 1 INJECTION, POWDER, FOR SOLUTION INTRAMUSCULAR; INTRAVENOUS at 13:33:00

## 2025-01-15 RX ADMIN — ROCURONIUM BROMIDE 30 MG: 10 INJECTION, SOLUTION INTRAVENOUS at 13:46:00

## 2025-01-15 RX ADMIN — LIDOCAINE HYDROCHLORIDE 50 MG: 10 INJECTION, SOLUTION EPIDURAL; INFILTRATION; INTRACAUDAL; PERINEURAL at 13:08:00

## 2025-01-15 RX ADMIN — EPHEDRINE SULFATE 5 MG: 50 INJECTION INTRAVENOUS at 14:25:00

## 2025-01-15 RX ADMIN — DEXAMETHASONE SODIUM PHOSPHATE 8 MG: 4 MG/ML VIAL (ML) INJECTION at 13:08:00

## 2025-01-15 RX ADMIN — SODIUM CHLORIDE, SODIUM LACTATE, POTASSIUM CHLORIDE, CALCIUM CHLORIDE: 600; 310; 30; 20 INJECTION, SOLUTION INTRAVENOUS at 13:03:00

## 2025-01-15 RX ADMIN — PHENYLEPHRINE HCL 100 MCG: 10 MG/ML VIAL (ML) INJECTION at 13:36:00

## 2025-01-15 NOTE — OPERATIVE REPORT
[facility]   Operative Note    Dawood Shelton Location: OR   [facility] [id number] MRN [id number]   Admission Date 1/15/2025 Operation Date 1/15/2025   Attending Physician Anum Johnston MD Operating Physician Anum Johnston MD     Date of procedure:    January 15, 2025.    Pre-Operative Diagnosis: Acute cholecystitis, cholelithiasis     Indication for Surgery: Acute cholecystitis, cholelithiasis    Post-Operative Diagnosis: Same as above-diffuse intra-abdominal adhesions status post traumatic injury of the liver and pancreas 25 years ago with hemoperitoneum, laparotomy, repair of pancreas, severe acute cholecystitis    Procedure performed:  Laparoscopic lysis of adhesions for 75 minutes, laparoscopic cholecystectomy with ICG cholangiogram, placement of Surgiflo, placement additional 5 mm assist port    Surgeons and Role:     * Anum Johnston MD - Primary    Assistant:   ARTHUR Kennedy    Anesthesia: General    History of Present Illness:      74-year-old gentleman who presents to the [facility] emergency department with complaints of right-sided abdominal pain.  The patient is being interviewed with his wife at the bedside.  In  Reports acute onset of right sided flank and back pain late yesterday evening.  He describes the pain as severe, stabbing, unremitting.  The patient reports that this morning the pain was slightly improved but still fairly significantly severe causing him to seek evaluation in the emergency department.  The patient reports that he does have a history of kidney stones and has had lithotripsy with Dr. Fry in the past.  He believed that this episode of severe abdominal pain was related to his kidney stones.  He did not note any dysuria or hematuria.  He did note nausea associated with the pain.     Past medical/surgical history-patient has had a an exploratory laparotomy through a left paramedian incision in 1975 status post traumatic injury of the pancreas and the liver.  The details  of the procedure not known to the patient or his wife however his wife states that they did \"put stitches in the pancreas to put it back together and there was duct work  done '.  Patient also has a history of arrhythmia, nephrolithiasis, thyroid carcinoma status post thyroidectomy and RT, COVID in 2021, pulmonary blastomycosis, OA, arthroscopy of shoulder, cataract, left total hip replacement 2018, lithotripsy April 2023, multiple cystoscopies  Anticoagulated-patient is not anticoagulated  Workup in the ED revealed normal WBC, urine analysis is negative, CMP revealed glucose of 146, SGOT of 46.  CT scan of the abdomen pelvis reveals atelectasis and calcifications in the right lung in the posterior mediastinum.  Cholelithiasis with gallbladder wall thickening and inflammatory changes surrounding the gallbladder highly suspicious for acute cholecystitis.  No ductal dilatation is noted.  Bilateral fat-containing inguinal hernias are noted.     CT scan findings were discussed in detail with Alexandro and his wife at the bedside.  We did discuss the incidental finding of bilateral inguinal hernias which she will address with his PCP Dr. Finney  We discussed the finding of cholelithiasis with adjacent inflammatory changes consistent with acute cholecystitis.    We discussed potential treatment options including laparoscopic cholecystectomy with intraoperative cholangiogram.  We did discuss the potential risks and benefits of surgery specifically in light of the patient's prior history of laparotomy for pancreatic and hepatic injury and the increased risk for intra-abdominal organ injury and CBD injury.  At this time due to ongoing symptoms, Alexandro and his wife wish to proceed with surgery today.  They have no further questions at this time.       Discussed with patient:  The potential risks and benefits of the procedure were discussed in detail with the patient including but not limited to bleeding, infection, seroma/hematoma  formation, postoperative wound complications including development of a hernia, trocar injury, intra-abdominal organ injury, bile leakage, biloma formation, common bile duct injury, conversion to an open procedure, possible need for a drain, possible recurrence or persistence of symptoms despite surgery,  postoperative diarrhea, possible need for further treatment/intervention pending intra-operative/IOC findings etc, pneumonia, postoperative thromboembolic event including DVT and PE. These and other potential intra-operative and post-operative risks were reviewed with the patient and they do not have any questions and does wish to proceed with surgery today.    Note:   A surgical assistant was essential to the proper conduct of this case particularly the aspect of dissection necessary in and around the hilum of the gallbladder, identification of critical structures such as the cystic duct, common bile duct, cystic artery and cystic plate.    Summary of Procedure:   The patient was taken to the operating room and was placed on the OR table in a supine position. After the induction of general anesthesia, perioperative antibiotics were given. The patient was then prepped and draped in usual sterile fashion. Using local anesthesia a jocy-umbilical incision was made and the anterior abdominal fascia was elevated with a Kocker forcep. The Veress needle was introduced into the abdomen and the abdomen was insufflated to 15 mm mercury. The Veress needle was then exchanged for a 5 mm port.  The camera was introduced to the 5 mm port.  Initial evaluation of the abdomen revealed that the entire greater omentum and transverse colon was adherent to the anterior abdominal wall.  The umbilical port site was actually under the greater omentum.  There was an area free of adhesions in the right lower quadrant.  A second 5 mm port was placed in this area under direct vision.  Using a blunt grasper, the adhesions were attempted to be  swept down from the anterior abdominal wall however due to their chronicity, this was not possible.  We did attempt to drive the camera around the greater omentum apron laterally in an effort to reach the right upper quadrant.  Again due to the degree of adhesions, this was not possible.  An additional 5 mm port was placed in the midline in the lower abdomen.  Using these 2 ports, the LigaSure device was used to begin and lysis of adhesions.  Tedious lysis of the omental adhesions from the anterior abdominal wall was carried out using the LigaSure device.  Slowly, as the greater omentum began to drop down, the right upper quadrant came into view.  Care was taken to avoid injury to the transverse colon.  During this maneuver, a tattoo in the ascending colon was noted.    Lysis of adhesions continued until the right upper quadrant was brought into full view.  Adhesional lysis proceeded for approximately 60 minutes.  Once the right upper quadrant was accessed, the liver was brought into view.  The 2 customary epigastric and right upper quadrant 5 mm ports were now placed under direct vision.  Additional, dense adhesions in the right upper quadrant were noted and the gallbladder was completely obscured.  Further lysis of adhesions was performed for another 15 minutes again using the LigaSure and hook electrocautery.  Once the gallbladder was brought into view, the wall of the gallbladder was noted to be seen into the edematous.  The patient was placed into a reverse Trendelenburg position with the right side up.   The gallbladder was then grasped at the fundus, the Nelson's pouch was identified and this was retracted laterally to expose the triangle of Calot.  ICG fluorescent imaging was then performed to confirm anatomy during dissection in the cystic duct-gallbladder junction.  Dissection was performed to define the cystic duct for sufficient length.  Dissection was kept above the line of Rouviere and a critical view  was obtained.  ICG fluorescent imaging was again performed to confirm anatomy prior to transection of the cystic duct.  The cystic duct was then clipped once proximally and 3 times distally. The cystic duct was then divided. The cystic artery was identified and seen  to ascend onto the gallbladder wall.  This was also defined for a sufficient length and was clipped twice proximally and once distally and divided. Electrocautery was then used to dissect the gallbladder away from the liver bed.  The severe degree of inflammatory changes in the gallbladder wall made this maneuver somewhat more difficult.  Having completed this maneuver the gallbladder was placed into a Endopouch and was withdrawn through the umbilical port site. The right upper quadrant was copiously irrigated with antibiotic irrigation until the irrigant was clear. The clips across the cystic duct and the cystic artery were examined and found to be intact. There was no evidence of bleeding or bile leakage from the liver bed. The remainder of the irrigation fluid, which was clear, was aspirated.  Due to the degree of inflammatory changes, Surgiflo was placed into the gallbladder fossa.  The accessory ports were removed under direct vision and there was no evidence of bleeding from the anterior abdominal wall. The abdomen was then deflated. The umbilical port was closed with 0 Vicryl.   All skin incisions were closed with 4-0 Vicryl in a subcuticular manner.  All sponge, instrument and needle counts were correct at the conclusion of the procedure. The patient did tolerate the procedure well.  The patient was taken to the recovery room in stable condition.  Intraoperative findings were reviewed in detail with Alexandro's wife postoperatively.  We did discuss the degree of intra-abdominal adhesions due to the patient's prior trauma and laparotomy, we discussed the severe inflammatory changes in the gallbladder wall.  We also discussed the tattoo which was seen  in the ascending colon. Upon review of the patient's chart, he did have a colonoscopy in November 2017 at which time multiple polyps including a large 17 mm adenomatous polyp was identified and subsequently tattooed.  The patient was instructed to follow-up in 6 months for repeat surveillance endoscopy.  The patient did see the gastroenterologist for a follow-up visit 1 year later at which time the follow-up surveillance colonoscopy was overdue.  I could not find any other documentation of a follow-up colonoscopy.  Alexandro Mireles's wife, states that she cannot recall a follow-up colonoscopy being performed.  We did discuss that since it has been 8 years since the identification of that large polyp, Alexandro should follow-up with gastroenterology for repeat colonoscopy.  She states that she will discuss the referral to gastroenterologist with his PCP Dr. Finney.    Complications:  None    EBL:    25 cc    Pathologic specimens: The gallbladder and its contents    GOVIND Johnston MD, FACS      Please note that this report has been produced using speech recognition software and may contain errors related to that system including but not limited to errors in grammar, punctuation and spelling as well as words and phrases that possibly may have been recognized inappropriately.  If there are any questions or concerns please contact the dictating provider for clarification.  The 21st Century Cures Act makes medical notes like these available to patients in the interest of trans parency. Please be advised this is a medical document. Medical documents are intended to carry relevant information, facts as evident, and the clinical opinion of the practitioner. The medical note is intended as peer to peer communication and may appear blunt or direct. It is written in medical language and may contain abbreviations or verbiage that are unfamiliar.  If there are any questions or concerns please contact the dictating provider for clarification.

## 2025-01-15 NOTE — DISCHARGE INSTRUCTIONS
Home Care Instructions  Cholecystectomy  Dr. Anum Johnston    MEDICATIONS  For post-operative pain control the medications are usually Norco or Tylenol #3.  These are narcotics and are best taken by starting with one tablet and repeating every four to six hours as needed.  If the patient does not feel they need the narcotics they shouldn’t take them.  If the pain is severe the patient may take up to two pills every four hours.  If a minor supplement is necessary in addition to the narcotics do not overlap with Tylenol (any product with acetaminophen) as both Norco and Tylenol #3 contain Tylenol.  Please supplement with Advil (ibuprofen) or Motrin.  Please ask your surgeon before resuming blood thinners such as aspirin, Plavix or Coumadin.  All other home medications may be resumed as scheduled.  With severe cholecystitis, antibiotics will also be prescribed.  With antibiotics, please watch for rash, facial swelling or severe diarrhea.    DIET  The patient may resume a general diet immediately.  This is not a good time to eat excessively.  The patient should eat in moderation and stick with foods the patient feels are easy to digest.  Avoid high fat foods in the immediate post-operative time period as this may still cause some problems.  The patient may eat anything in small amounts but foods rich in dairy products, fatty foods or fried foods may cause an upset stomach for up to six weeks after surgery.  There should be no alcohol consumption in the immediate recovery time period or within six hours of taking narcotics.    WOUND CARE  The top dressing may be removed the day after surgery.  This includes the gauze, tape and band-aids if they are present.  Do not remove the steri-strips or butterfly tapes that are white and adherent to the skin.  The steri-strips will eventually peel up at the ends and at this point they may be removed.  This is usually seven to ten days after surgery.  The patient may shower the day  after surgery.  There is no need to cover the incisions, and all top gauze type dressing should be removed prior to showering.  Soap can get on the wounds but do not scrub over the wounds.  No hair dye or chemicals of any kind should get in the wounds.  Avoid tub baths, swimming or sitting in a hot tub for two weeks.  If visible sutures or staples are present they will be removed in the office by the surgeon or nurse.  Most wounds will be closed with dissolving suture underneath the skin.  These sutures will dissolve on their own.  If a drain is present make sure the patient receives drain care instructions from the nurse prior to discharge.  Most patients will not have a drain.    ACTIVITY  Every day the patient should be up, showered and dressed.  Each day the patient should be up and around the house.  The patient should not lie in bed and should not stay in pajamas.  We count on the patient being up, coughing, walking and deep breathing to avoid pneumonia and blood clots in the legs.  Once a day the patient should get out of the house and go shopping, go to the mall, the [employer] store, the [employer] or a restaurant.  The patient may ride in a car but should not drive the car for at least one week.  Patients should be off narcotics for at least 8 hours prior to being a .  The average time off work is 10 to 14 days; most adults will be seeing the surgeon prior to returning to work.  Students will return to school within 1-5 days after discharge from the hospital but will be off gym, sports, and indoors for recess for one month.  Patients may go up and down stairs and lift up to five pounds but no bending, pushing or pulling.  Nothing called work or exercise until the follow up visit.  No ‘stair-master’, power walking, jogging or workouts until the follow up visit.  Patients should seek further activity limits at the time of their appointment.    APPOINTMENT  Please call our office for an appointment within  five to ten days of discharge.  Any fever greater than 100.5, chills, nausea, vomiting, or severe diarrhea please call our office.  If the wound turns red, hot, swollen, becomes increasingly painful, or drains pus call us immediately at [phone].  For life threatening emergencies call 911.  For non-emergent care please call our office after 8:30 a.m. Monday through Friday.  The number listed above is our office number; our phone automatically switches to our answering service if we are not there.  Please do not use the emergency room for non-urgent care.    Thank you for entrusting us with your care.  [facility]--General Surgery          You have been given a prescription for Norco 5/325  Norco was Given to you at: 5:30 PM  Next dose due:    Take this medication as directed  This medication contains Tylenol (acetaminophen)  Do not take additional Tylenol while taking Norco    Norco is a Narcotic and can be constipating or upset your stomach  Don't take Norco on an empty stomach  Drink plenty of water  Alcoholic beverages should be avoided while taking narcotics

## 2025-01-15 NOTE — ED INITIAL ASSESSMENT (HPI)
Patient to ER with c/o severe abdominal pain around [id number] last night, pain \"went away completely\" one hour ago per patient. No pain medications were taken PTA. Patient denies any N/V/D. Hx of kidney stones and believes that's what the pain was.
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
## 2025-01-15 NOTE — CM/SW NOTE
[facility] assistance requested.  Patient requesting transfer to [facility] for admission - patient's PCP is at [facility].  Spoke to Michelle, [phone].  They are currently hold 6 patients in the ER - they have no available beds.  MD updated.

## 2025-01-15 NOTE — ANESTHESIA PROCEDURE NOTES
Airway  Date/Time: 1/15/2025 1:16 PM  Urgency: elective    Airway not difficult    General Information and Staff    Patient location during procedure: OR  Anesthesiologist: Devante Workman DO  Performed: anesthesiologist   Performed by: Devante Workman DO  Authorized by: Devante Workman DO      Indications and Patient Condition  Indications for airway management: anesthesia  Sedation level: deep  Preoxygenated: yes  Patient position: sniffing  Mask difficulty assessment: 1 - vent by mask    Final Airway Details  Final airway type: endotracheal airway      Successful airway: ETT  Cuffed: yes   Successful intubation technique: Video laryngoscopy  Facilitating devices/methods: intubating stylet  Endotracheal tube insertion site: oral  Blade: GlideScope  Blade size: #4  ETT size (mm): 7.5    Cormack-Lehane Classification: grade I - full view of glottis  Placement verified by: capnometry   Measured from: lips  ETT to lips (cm): 22  Number of attempts at approach: 1    Additional Comments  Initial attempt with DL yielding G3V; withdrew and used Glidescope for G1V and subsequent tracheal intubation  Oral mucosa remains in preoperative state following airway instrumentation.
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
## 2025-01-15 NOTE — ANESTHESIA POSTPROCEDURE EVALUATION
[facility]    Dawood Shelton Patient Status:  Emergency   Age/Gender 74 year old male MRN [id number]   Location [facility] SURGERY Attending Anum Johnston MD   Hosp Day # 0 PCP ANTONIO SELBY       Anesthesia Post-op Note    LAPAROSCOPIC LYSIS OF ADHESIONS, LAPAROSCOPIC CHOLECYSTECTOMY WITH INDOCYANINE GREEN, PLACEMENT OF SURGIFLO    Procedure Summary       Date: 01/15/25 Room / Location:  MAIN OR 11 /  MAIN OR    Anesthesia Start: 1303 Anesthesia Stop: 1610    Procedure: LAPAROSCOPIC LYSIS OF ADHESIONS, LAPAROSCOPIC CHOLECYSTECTOMY WITH INDOCYANINE GREEN, PLACEMENT OF SURGIFLO (Abdomen) Diagnosis:       Cholelithiasis      (Cholelithiasis [K80.20])    Surgeons: Anum Johnston MD Anesthesiologist: Devante Workman DO    Anesthesia Type: general ASA Status: 1 - Emergent            Anesthesia Type: general    Vitals Value Taken Time   /80 01/15/25 1611   Temp 97.2 F 01/15/25 1611   Pulse 81 01/15/25 1611   Resp 16 01/15/25 1611   SpO2 100 01/15/25 1611           Patient Location: PACU    Anesthesia Type: general    Airway Patency: patent    Postop Pain Control: adequate    Mental Status: mildly sedated but able to meaningfully participate in the post-anesthesia evaluation    Nausea/Vomiting: none    Cardiopulmonary/Hydration status: stable euvolemic    Complications: no apparent anesthesia related complications    Postop vital signs: stable    Dental Exam: Unchanged from Preop    Patient to be discharged from PACU when criteria met.

## 2025-01-15 NOTE — H&P (VIEW-ONLY)
VASCULAR SURGERY INPATIENT CONSULT     ADMISSION DATE:  11/3/2022  CONSULTING PHYSICIAN:  Ibis Davis MD  ATTENDING PHYSICIAN:  Faheem Casiano MD    REQUESTING PROVIDER:  Faheem Casiano MD  CODE STATUS:  Full Resuscitation      CHIEF COMPLAINT:  dizziness    SUBJECTIVE:  dizziness    HISTORY OF PRESENT ILLNESS:  Garland Pinto is a 82 year old male who presented to ED on two occasions over past two days with acute onset of positional dizziness. Daughter and wife accompany patient. Per patient he has peripheral vertigo due to \"crystals in my ear\" which has previously been treated with meclizine with relief. This time meclizine has provided little relief. PCP auscultated a bruit on exam of the right neck and duplex was ordered demonstrating high grade stenosis. Left carotid is occluded. Patient recommending to come to ED for further work-up. NIHSS stroke scale score of 0 in the ED.     On interview patient and family deny any prior or current motor, sensory, speech, or visual deficits. Atherosclerotic risk factors include lifelong and current everyday smoker as well as hypertension. No hyperlipidemia or DM. Denies any exertional chest pain but does have shortness of breath when climbing a flight of stairs. No exertional or rest pain in lower extremities. No discoloration or wounds    PROBLEM LIST:    Patient Active Problem List   Diagnosis   • Hyperlipidemia   • Generalized anxiety disorder   • Essential hypertension   • Chronic kidney disease, stage 3, mod decreased GFR (CMS/HCC)   • Tobacco dependence   • COPD, mild (CMS/HCC)   • Vitamin D deficiency   • Carotid stenosis, bilateral       PAST MEDICAL HISTORY:  (Reviewed)     Hyperlipidemia                                                Basal cell cancer                               05/09/2007      Comment: back    Vertigo                                         10/30/2012    Internal hemorrhoids                                          HTN  [facility]  Report of Consultation    Dawood Shelton Patient Status:  Emergency    1950 MRN [id number]   Location [facility] PRE [facility] HOLD[facility] Attending Anum Johnston MD   Hosp Day # 0 PCP ANTONIO SELBY     Date of service:      January 15, 2025    Requesting Physician: Dr. Larry ED    Reason for Consultation:  Acute cholecystitis, cholelithiasis    History of Present Illness:  Dawood Shelton is a a(n) 74 year old male    74-year-old gentleman who presents to the [place] emergency department with complaints of right-sided abdominal pain.  The patient is being interviewed with his wife at the bedside.  In  Reports acute onset of right sided flank and back pain late yesterday evening.  He describes the pain as severe, stabbing, unremitting.  The patient reports that this morning the pain was slightly improved but still fairly significantly severe causing him to seek evaluation in the emergency department.  The patient reports that he does have a history of kidney stones and has had lithotripsy with Dr. Fry in the past.  He believed that this episode of severe abdominal pain was related to his kidney stones.  He did not note any dysuria or hematuria.  He did note nausea associated with the pain.    Past medical/surgical history-patient has had a an exploratory laparotomy through a left paramedian incision in  status post traumatic injury of the pancreas and the liver.  The details of the procedure not known to the patient or his wife however his wife states that they did \"put stitches in the pancreas to put it back together and there was duct work  done '.  Patient also has a history of arrhythmia, nephrolithiasis, thyroid carcinoma status post thyroidectomy and RT, COVID in , pulmonary blastomycosis, OA, arthroscopy of shoulder, cataract, left total hip replacement , lithotripsy 2023, multiple cystoscopies  Anticoagulated-patient is not anticoagulated      History:  Past  (hypertension)                                            Tobacco dependence                              9/26/2016     COPD, mild (CMS/HCC)                            10/17/2017     PAST SURGICAL HISTORY:     EXTRACAPSULAR CATARACT REMOVAL W INSERT IO LEAH*                 Comment: Cataract Removal Lens Implant- bilateral    REMV PILONIDAL LESION SIMPLE                                  EXC SKIN BENIG 0.6-1CM FACE,FACIAL              03/27/201*      Comment: benign seborrheic keratosis    SKIN LESION EXCISION                            05/09/2007      Comment: BCC-back    SKIN LESION EXCISION                            05/09/2007      Comment: intradermal nevus chin    ALLERGIES:  ALLERGIES:  No Known Allergies     SOCIAL HISTORY:  Social History     Tobacco Use   Smoking Status Current Every Day Smoker   • Packs/day: 0.25   • Years: 70.00   • Pack years: 17.50   • Types: Cigarettes   Smokeless Tobacco Never Used   Tobacco Comment    4 cigarettes per day     Social History     Substance and Sexual Activity   Alcohol Use Yes   • Alcohol/week: 0.0 standard drinks       FAMILY HISTORY:  Family History   Problem Relation Age of Onset   • Cancer Mother         lung cancer   • Cancer Father         lung cancer   • Heart disease Father    • Depression Father    • Heart disease Brother    • Cancer Brother        ALLERGIES:  ALLERGIES:  No Known Allergies     CURRENT MEDICATIONS:   Current Facility-Administered Medications   Medication Dose Route Frequency Provider Last Rate Last Admin   • albuterol inhaler 2 puff  2 puff Inhalation Q4H PRN Pedro Neely, DO       • umeclidinium-vilanterol (ANORO ELLIPTA) 62.5-25 MCG/ACT inhaler 1 puff  1 puff Inhalation Daily Resp Pedro Neely, DO       • citalopram (CeleXA) tablet 10 mg  10 mg Oral Daily Pedro Neely, DO       • clonazePAM (KlonoPIN) tablet 0.5 mg  0.5 mg Oral Daily PRN Pedro Neely, DO       • fenofibrate micronized (LOFIBRA) capsule 134 mg  134 mg Oral Daily with  Medical History:    Arrhythmia    \"extra heartbeats\",diagnosed many years ago at [facility]    Calculus of kidney    Cancer (HCC)    thyroid    COVID    cough    Exposure to medical diagnostic radiation    radioactive treatment after thyroid surgery    History of blood transfusion    North American primary pulmonary blastomycosis (HCC)    Osteoarthritis    Thyroid disease    Unspecified disorder of thyroid    Visual impairment    glaucoma prevention,high pressures     Past Surgical History:   Procedure Laterality Date    Arthroscopy of joint unlisted Left     shoulder    Cataract      Hip replacement surgery Left 08/10/2018    Lithotripsy  04/19/2023    lithotripsy and laser of prostate    Liver biopsy tray to bedside      Other surgical history      liver/pacreas repair after rupture during accident    Other surgical history      thyroid sx    Other surgical history      Left knee arthroscopy    Other surgical history  05/23/2017    cysto, right eswl, right stent    Other surgical history  05/25/2017    cysto, stent removal Dr Fry    Other surgical history      cysto, Lt RPG, LT URS, laser litho, Lt stents placements Dr Fry    Other surgical history  07/02/2019    cysto stent removal  Dr Fry    Other surgical history  12/14/2020    cysto/trus Dr. Fry    Other surgical history Left     achilles tendon repair    Pancreas surgery proc unlisted      Thyroidectomy       Family History   Problem Relation Age of Onset    Diabetes Father     Heart Disorder Father     Diabetes Sister       reports that he has never smoked. He has never used smokeless tobacco. He reports that he does not currently use alcohol. He reports that he does not use drugs.    Allergies:  Allergies[1]    Medications:  No current facility-administered medications for this encounter.    Review of Systems:    Allergic/Immuno:  Review of patient's allergies performed.  Constitutional:  Negative for decreased activity, n fever, irritability and  breakfast Pedro Player, DO       • [Held by provider] lisinopril (ZESTRIL) tablet 30 mg  30 mg Oral Daily Pedro Player, DO       • rosuvastatin (CRESTOR) tablet 20 mg  20 mg Oral Daily Pedro Player, DO       • sodium chloride 0.9 % flush bag 25 mL  25 mL Intravenous PRN Pedro Player, DO       • sodium chloride (NORMAL SALINE) 0.9 % bolus 500 mL  500 mL Intravenous PRN Pedro Player, DO       • hydrALAZINE (APRESOLINE) injection 10 mg  10 mg Intravenous Q6H PRN Pedro Player, DO       • Potassium Standard Replacement Protocol (Levels 3.5 and lower)   Does not apply See Admin Instructions Pedro Player, DO       • Magnesium Standard Replacement Protocol   Does not apply See Admin Instructions Pedro Player, DO       • ondansetron (ZOFRAN) injection 4 mg  4 mg Intravenous BID PRN Pedro Player, DO       • acetaminophen (TYLENOL) tablet 650 mg  650 mg Oral Q4H PRN Pedro Player, DO       • polyethylene glycol (MIRALAX) packet 17 g  17 g Oral Daily PRN Pedro Player, DO       • sodium chloride 0.9 % flush bag 25 mL  25 mL Intravenous PRN Vivek Cuellar MD       • sodium chloride (PF) 0.9 % injection 2 mL  2 mL Intracatheter 2 times per day Vivek Cuellar MD       • heparin (porcine) 25,000 units/250 mL in dextrose 5 % infusion  1-40 Units/kg/hr (Order-Specific) Intravenous Continuous Vivek Cuellar MD 14 mL/hr at 11/04/22 0524 15 Units/kg/hr at 11/04/22 0524   • heparin (porcine) injection 7,400 Units  80 Units/kg (Order-Specific) Intravenous PRN Vivek Cuellar MD       • heparin (porcine) injection 3,700 Units  40 Units/kg (Order-Specific) Intravenous PRN Vivek Cuellar MD       • aspirin (ECOTRIN) enteric coated tablet 81 mg  81 mg Oral Daily Pedro Player, DO            REVIEW OF SYSTEMS:  Complete Review of Systems completed.  Pertinent symptoms and complaints noted in the HPI (history of present illness) and ROS (review of systems) above.  No other significant complaints noted.    OBJECTIVE:  lethargy, n unintentional weight loss  Endocrine:  Negative for abnormal sleep patterns, increased activity, polydipsia and polyphagia  HEENT:  Negative for hearing changes,  Nasal discharge  Eyes:  Negative for eye discharge, visual disturbance   Cardiovascular:  Negative for cool extremity and irregular heartbeat/palpitations  Respiratory:  Negative for cough, dyspnea and wheezing, SOB  Gastrointestinal:  y  abdominal pain,  y anorexia, y nausea, n emesis, n diarrhea, n constipation, n hematochezia  Genitourinary:  Negative for dysuria and hematuria  Hema/Lymph:  Negative for easy bleeding and easy bruising  Integumentary:  Negative for pruritus and rash, changing pigmented moles, lesions  Musculoskeletal:  Negative for bone/joint symptoms, negative for muscle aches or cramping  Neurological:  Negative for gait disturbance, headaches  Psychiatric:  Negative for inappropriate interaction and psychiatric symptoms      Physical Exam:  Blood pressure 133/74, pulse 69, temperature 97.7 °F (36.5 °C), temperature source Oral, resp. rate 16, height 72\", weight 200 lb (90.7 kg), SpO2 94%.    General:WDWN, in no acute distress   HEENT: Exam is unremarkable.  Without scleral icterus.  Mucous membranes are moist.  Oropharynx is clear.  Neck:  No JVD. Supple.   Lungs: Clear to auscultation bilaterally.  Cardiac: Regular rate and rhythm. No murmur.  Abdomen: Soft, nondistended, focally tender to deep palpation in the right upper quadrant and epigastrium.  Mild voluntary guarding.  No involuntary guarding, percussion tenderness or rebound is noted no peritoneal signs.   Large left paramedian incision status post laparotomy.  Extremities:  No lower extremity edema noted.  Without clubbing or cyanosis.    Skin: Normal texture and turgor.  Neurologic: Cranial nerves are grossly intact.  Alert and oriented x 3.  No focal neurologic deficit.    Laboratory Data:  Recent Labs   Lab 01/15/25  0700   RBC 5.36   HGB 16.5   HCT 48.0   MCV    VITAL SIGNS:    Vitals Last Value 24-Hour Range   Temperature 98.1 °F (36.7 °C) (11/04/22 0530) Temp  Min: 97.5 °F (36.4 °C)  Max: 98.1 °F (36.7 °C)   Pulse 72 (11/04/22 0530) Pulse  Min: 72  Max: 116   Respiratory 20 (11/04/22 0530) Resp  Min: 16  Max: 21   Non-Invasive  Blood Pressure 128/60 (11/04/22 0530) BP  Min: 116/64  Max: 188/82   Pulse Oximetry 96 % (11/04/22 0122) SpO2  Min: 91 %  Max: 96 %     Vitals Today Admitted   Weight 96.7 kg (213 lb 3 oz) (11/04/22 0528) Weight: 93.6 kg (206 lb 5.6 oz) (11/03/22 1725)   Height N/A Height: 5' 9\" (175.3 cm) (11/04/22 0122)   BMI N/A BMI (Calculated): 30.95 (11/04/22 0122)     INTAKE/OUTPUT:      Intake/Output Summary (Last 24 hours) at 11/4/2022 0840  Last data filed at 11/4/2022 0530  Gross per 24 hour   Intake --   Output 200 ml   Net -200 ml        CONSTITUTIONAL:  The patient is well developed and well nourished, in no apparent distress.   EYES:  Conjunctivae pink.  No ptosis.  Pupils are equal, round.  ENMT:  Head is normocephalic.  Nares and ears appeared normal.  Mouth is well hydrated and without lesions.  Nasal mucosal membranes are moist, no nasal congestion.   NECK:  No masses.  Trachea midline.  No thyromegaly.  RESPIRATORY:  Clear to auscultation bilaterally with normal respiratory effort.  CARDIOVASCULAR:  Heart sounds - regular rate and rhythm without murmur.  Vascular: Palpable radial, brachial, carotid, femoral, popliteal, and dorsalis pedis pulses without appreciable aneurysms. Nonpalpable left carotid artery   GASTROINTESTINAL:  Soft, nontender, and nondistended. No palpable pulsatile masses.   Extremities  RLE: warm and dry.  No other gangrene, ulceration, or signs of microembolization. No significant edema, or varicosities.  LLE:warm and dry.  No other gangrene, ulceration, or signs of microembolization.No significant edema, or varicosities.  PSYCHIATRIC:  Alert and oriented in time, place and person with appropriate affect.      Laboratory  89.6   MCH 30.8   MCHC 34.4   RDW 13.1   NEPRELIM 7.58   WBC 9.9   .0     Recent Labs   Lab 01/15/25  0700   *   BUN 23   CREATSERUM 1.23   CA 10.0   ALB 4.7      K 3.7      CO2 25.0   ALKPHO 105   AST 46*   ALT 31   BILT 0.6   TP 7.8         Assessment/Plan:     74-year-old gentleman who presents with right-sided abdominal and flank pain.  The patient presumed this to be due to a kidney stone as he has had an extensive history of kidney stones in the past.       Workup in the ED revealed normal WBC, urine analysis is negative, CMP revealed glucose of 146, SGOT of 46.  CT scan of the abdomen pelvis reveals atelectasis and calcifications in the right lung in the posterior mediastinum.  Cholelithiasis with gallbladder wall thickening and inflammatory changes surrounding the gallbladder highly suspicious for acute cholecystitis.  No ductal dilatation is noted.  Bilateral fat-containing inguinal hernias are noted.    CT scan findings were discussed in detail with Alexandro and his wife at the bedside.  We did discuss the incidental finding of bilateral inguinal hernias which she will address with his PCP Dr. Finney  We discussed the finding of cholelithiasis with adjacent inflammatory changes consistent with acute cholecystitis.    We discussed potential treatment options including laparoscopic cholecystectomy with intraoperative cholangiogram.  We did discuss the potential risks and benefits of surgery specifically in light of the patient's prior history of laparotomy for pancreatic and hepatic injury and the increased risk for intra-abdominal organ injury and CBD injury.  At this time due to ongoing symptoms, Alexandro and his wife wish to proceed with surgery today.  They have no further questions at this time.      Discussed:   The potential treatment options were discussed with the patient.  The potential risks, benefits, outcomes/recovery and alternatives to any proposed surgery were also fully  Results:   Lab Results   Component Value Date    WBC 11.0 11/04/2022    HCT 40.3 11/04/2022    HGB 13.6 11/04/2022     11/04/2022    INR 1.0 11/03/2022    PTT 90 (HH) 11/04/2022    BUN 23 (H) 11/04/2022    CREATININE 1.29 (H) 11/04/2022    SODIUM 138 11/04/2022    POTASSIUM 3.9 11/04/2022    CHLORIDE 108 11/04/2022    AST 25 11/04/2022    ALKPT 52 11/04/2022    BILIRUBIN 0.6 11/04/2022    CO2 23 11/04/2022    GPT 26 11/04/2022    GLUCOSE 113 (H) 11/04/2022       IMAGING:  US CAROTID DUPLEX BILATERAL    Result Date: 11/3/2022  Narrative: EXAM: US CAROTID DUPLEX BILATERAL DATE: 11/3/2022 3:23 PM HISTORY: Other specified symptoms and signs involving the circulatory and respiratory systems, Dizziness and giddiness COMPARISON: CT cervical spine 2/18/22. TECHNIQUE: Gray-scale, color flow and spectral Doppler imaging of the bilateral carotid and vertebral arteries was performed, with segmental recordings of velocity spectra. FINDINGS: The following peak systolic velocities were measured (units given in cm/s), end diastolic velocities are listed when abnormal: Right: Internal carotid:   571,  Common carotid: 60 External carotid:  235 Systolic ratio:      9.5 Left: Internal carotid:   48, EDV 6 Common carotid: 41 External carotid:  162 Systolic ratio:      N/A Right:  Longitudinal color-flow images reveal all vessels to be patent, with a normal flow direction. There is moderate plaque formation involving the carotid bulb and proximal internal carotid artery. The plaque is predominantly hyperechoic. The vertebral artery is patent and has normal flow direction. Left: There is complete occlusion of the left ICA. The carotid bulb and proximal ICA are completely obscured by dense posterior acoustic shadowing from calcified plaque. The vertebral artery is patent and has normal flow direction.     Impression: IMPRESSION: Abnormal study. 1. RIGHT CAROTID: Critical stenosis (near total occlusion) using consensus  reviewed with the patient. Any proposed surgical procedure was described in detail.  The patient verbalizes understanding.  All questions from the patient were discussed in detail to the patient's satisfaction.  No other questions were presented at this time.  Incidental abnormalities found on serology or imaging will be addressed by the patient's PCP or other services as appropriate.    Imaging was reviewed independently and with radiologist if available.    Thank you,   Anum Johnston MD      Please note that this report has been produced using speech recognition software and may contain errors related to that system including but not limited to errors in grammar, punctuation and spelling as well as words and phrases that possibly may have been recognized inappropriately.  If there are any questions or concerns please contact the dictating provider for clarification.  The 21st Century Cures Act makes medical notes like these available to patients in the interest of trans parency. Please be advised this is a medical document. Medical documents are intended to carry relevant information, facts as evident, and the clinical opinion of the practitioner. The medical note is intended as peer to peer communication and may appear blunt or direct. It is written in medical language and may contain abbreviations or verbiage that are unfamiliar.  If there are any questions or concerns please contact the dictating provider for clarification.             [1]   Allergies  Allergen Reactions    Iodine Solution [Povidone Iodine] HIVES     Contrast iodine    Flomax [Tamsulosin] OTHER (SEE COMMENTS)     Tachycardia     Radiology Contrast Iodinated Dyes HIVES      criteria adapted to duplex ultrasound in the setting of contralateral occlusion (80-99%). Moderate focal plaque formation as detailed above. Consider urgent vascular surgery consultation. CT angiography of the neck may be helpful for complete characterization. 2. LEFT CAROTID: Left internal carotid artery occlusion. 3. Vertebral artery flow is antegrade bilaterally. The above critical findings and their significance were discussed with Dr. Messina's nurse, Christina Arias RN, by Cornelio De Jesus M.D. on 11/3/2022 at 1608. [facility] CONSENSUS PANEL GRAYSCALE AND DOPPLER ULTRASOUND CRITERIA FOR DIAGNOSIS OF ICA STENOSIS (IAC 2021 Adjusted). Primary parameters:                                                                                   Additional parameters: Degree of Stenosis (%)      ICA PSV (cm/s)       Plaque Estimate (%)          ICA/CCA ratio (PSV)        ICA EDV (cm/s) Normal                                  < 125                         none                                          < 2.0                            <40 <50                                     < 180                         < 50                                           < 2.0                           <40 50-69                                     180-230                     > or = 50                                    2.0-4.0                          > or = 70                            >230                         > or = 50                                     >4.0                             >100     CT HEAD WO CONTRAST    Result Date: 11/3/2022  Narrative: CT HEAD WO CONTRAST HISTORY: Altered mental status. TECHNIQUE: Multiple axial images were obtained through the head without the administration of IV contrast. Sagittal and coronal images were generated. COMPARISON: 2/18/2022. FINDINGS: No evidence for acute intracranial hemorrhage or abnormal extra-axial fluid collection. No evidence of mass, mass effect, or midline shift. No CT evidence for acute  ischemic event. Please note MRI is more sensitive for acute ischemic changes. Chronic appearing lacunar infarcts the basal ganglia and thalamus. Cortical atrophy. Patchy is a low density in the periventricular white matter suggesting small vessel ischemic disease. Calvarium is intact and without fracture. Visualized paranasal sinuses and mastoid air cells are clear.     Impression: IMPRESSION: No acute intracranial findings. Other findings as above.    CTA HEAD AND NECK    Result Date: 11/3/2022  Narrative: CTA HEAD AND NECK CLINICAL INDICATION:  82 years-old Male with presenting history of Neuro deficit, acute, stroke suspected. COMPARISON:  CT head same day, bilateral carotid duplex ultrasound 3 hours prior. TECHNIQUE:  Using a multidetector, multislice helical scanner, routine CTA of the intracranial and extracranial circulation after administration of 60 cc of Omnipaque-350 intravenously.  Subsequently, venous phase CT head was performed with standard technique.  MIP reconstructions are rendered and archived to PACS.  Determination of the degree of stenosis in the internal carotid arteries is obtained using measurements of distal internal carotid diameter as the denominator for stenosis measurement.  The method utilized is similar to that utilized in the [place] American Symptomatic Carotid Endarterectomy Trial (NASCET).  FINDINGS: CT HEAD WITH CONTRAST:  Enhancement along the left greater sphenoid wing unclear if venous or possibly a lesion such as meningioma..  The major dural venous sinuses appear appropriately opacified. CTA NECK: Aortic Arch:  Mild scattered calcific atherosclerosis throughout the aortic arch and along the vessel origins without hemodynamically significant stenosis.  The great vessels demonstrate standard anatomic branching pattern. Right Brachiocephalic Artery:  Mild atherosclerosis without hemodynamically significant stenosis. Right Subclavian Artery:  Patent. Left Subclavian Artery:   Patent. Right Common Carotid:  Patent. Right Internal Carotid:  Severe atherosclerosis of the carotid bulb and proximal cervical internal carotid artery resulting in greater than 80% stenosis. Right External Carotid:  Patent. Left Common Carotid:  Patent. Left Internal Carotid:  Complete occlusion of the proximal cervical internal carotid artery extending through the clinoid segment of the left ICA (further discussed below). Left External Carotid:  Patent. Vertebral dominance:  Left. Right Vertebral (V1-V3):  Patent origin.  Patent cervical segment. Tiny lateral outpouching V3 segment series 3 image 292 may be ectasia or small aneurysm. Left Vertebral (V1-V3):  Limited assessment of the origin, but concerning for severe stenosis at the ostium.  Patent cervical segment. Osseous Structures:  There are multilevel degenerative changes up to moderate at C6-C7. CTA HEAD: ANTERIOR CIRCULATION: Right ICA:  Patent. Right MCA:  Patent. Right ELISABET:  Patent. Left ICA:  Occlusion of the proximal ICA with reconstitution of the supraclinoid segment. Left MCA:  Patent. Left ELISABET:  Patent. POSTERIOR CIRCULATION: Distal Right Vertebral artery (V4):  Patent. Distal Left Vertebral artery (V4):  Patent. Basilar Artery:  Patent. Right PCA:  Patent. Left PCA:  Patent. PICA/AICA/SCA:  Patent. COLLATERAL CIRCULATION: Anterior communicator:  Patent. Right Posterior communicator:  Patent. Left Posterior communicator:  Patent.     Impression: IMPRESSION: 1.  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 2.  Focal very severe stenosis left vertebral artery origin. The left vertebral artery is dominant. 3.  Greater than 80% stenosis of the right ICA origin. Similar to bilateral carotid duplex ultrasound. 4.  Questionable dural based lesion versus venous enhancement along the left greater sphenoid wing. Recommend MRI assessment. I, Attending Radiologist Rayray Arreaga MD, have reviewed the  images and report and concur with these findings interpreted by Resident Radiologist, Donnie Bowie MD.       DIAGNOSIS:  Asymptomatic high grade stenosis of the right carotid artery    IMPRESSION/PLAN:  82yoM with asymptomatic high grade stenosis of the right carotid artery and complete occlusion of the left extracranial carotid artery presenting with dizziness of unknown origin. CT head negative for CVA and NIHSS score of 0. Given degree of stenosis in the setting of contralateral occlusion, recommend intervention and this was discussed with patient. I recommend admission, therapeutic heparin gtt to maintain patency of carotid artery and prevent thrombus formation, and cardiac preoperative risk stratification and optimization prior to carotid intervention. Will also consult neurology to weigh in on etiology of dizziness and recommendations for MRI brain to rule out watershed issues. Discussed with patient indication for admission and intervention is to mitigate the risk of future stroke and alternative of ongoing surveillance. Patient would like to proceed with intervention. Discussed stenting vs endarterectomy. Will discuss in further detail once preop work-up complete    Discussed with hospitalist, Dr. Neely. Will add aspirin and statin to regimen. Avoid hypotension. Discussed with ED provider as well.     I really appreciate the opportunity of participating in the care of this patient.    Ibis Davis MD  Vascular Surgeon  Pager [phone]
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
## 2025-01-16 ENCOUNTER — TELEPHONE (OUTPATIENT)
Facility: LOCATION | Age: 75
End: 2025-01-16

## 2025-01-16 NOTE — TELEPHONE ENCOUNTER
Spoke with patient.  Patient states upon waking this morning his shirt found with blood on it.  I removed the bandage that was in place and reports no active bleeding but he did use a cloth to wipe the blood away.  Patient advised not to use a clothing to clean incisions rather allow soapy water to run over the incisions, rinse and pat dry.  Patient advised if bleeding is noted, he can apply a clean dry gauze to the area and apply abdominal band or pressure to it until it stops.  Patient advised to call the office if he needs further assistance.  Patient verbalized understanding

## 2025-01-16 NOTE — TELEPHONE ENCOUNTER
Patient is calling because one of the incisions is bleeding, it's the incision right by the belly button. When he wiped it off this morning, it wasn't actively bleeding, but he's wondering if it was possible that there was blood in his belly button that came out from under the band aid wrap.   Wants to know what he needs to do, if he should wait for it to continue to bleed, or what she should do about it at all. It's only the one right next to the belly button.     Please advise  Best callback number is [phone]    Future Appointments   Date Time Provider Department Center   1/29/2025  9:30 AM [id number]

## 2025-01-29 ENCOUNTER — OFFICE VISIT (OUTPATIENT)
Facility: LOCATION | Age: 75
End: 2025-01-29
Payer: MEDICARE

## 2025-01-29 VITALS
OXYGEN SATURATION: 96 % | RESPIRATION RATE: 18 BRPM | TEMPERATURE: 98 F | DIASTOLIC BLOOD PRESSURE: 90 MMHG | HEART RATE: 73 BPM | SYSTOLIC BLOOD PRESSURE: 146 MMHG

## 2025-01-29 DIAGNOSIS — Z90.49 STATUS POST LAPAROSCOPIC CHOLECYSTECTOMY: ICD-10-CM

## 2025-01-29 DIAGNOSIS — Z98.890 POSTOPERATIVE STATE: Primary | ICD-10-CM

## 2025-01-29 PROCEDURE — 1160F RVW MEDS BY RX/DR IN RCRD: CPT

## 2025-01-29 PROCEDURE — 3077F SYST BP >= 140 MM HG: CPT

## 2025-01-29 PROCEDURE — 1170F FXNL STATUS ASSESSED: CPT

## 2025-01-29 PROCEDURE — 1159F MED LIST DOCD IN RCRD: CPT

## 2025-01-29 PROCEDURE — 99024 POSTOP FOLLOW-UP VISIT: CPT

## 2025-01-29 PROCEDURE — 3080F DIAST BP >= 90 MM HG: CPT
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
## 2025-01-29 NOTE — INTERVAL H&P NOTE
Pre-op Diagnosis: Cholelithiasis [K80.20]    The above referenced H&P was reviewed by Anum Johnston MD on 1/29/2025, the patient was examined and no significant changes have occurred in the patient's condition since the H&P was performed.  I discussed with the patient and/or legal representative the potential benefits, risks and side effects of this procedure; the likelihood of the patient achieving goals; and potential problems that might occur during recuperation.  I discussed reasonable alternatives to the procedure, including risks, benefits and side effects related to the alternatives and risks related to not receiving this procedure.  We will proceed with procedure as planned.

## 2025-09-11 ENCOUNTER — APPOINTMENT (OUTPATIENT)
Dept: DERMATOLOGY | Age: 75
End: 2025-09-11

## (undated) DEVICE — SUTURE VLOC 90 3-0 9\" 2044

## (undated) DEVICE — SOL  0.9% 1000ML

## (undated) DEVICE — TROCAR: Brand: KII SHIELDED BLADED ACCESS SYSTEM

## (undated) DEVICE — KENDALL SCD EXPRESS SLEEVES, KNEE LENGTH, MEDIUM: Brand: KENDALL SCD

## (undated) DEVICE — KIT HEMSTAT MTRX 8ML PORCINE GEL HUM THROM

## (undated) DEVICE — STERILE LATEX POWDER-FREE SURGICAL GLOVESWITH NITRILE COATING: Brand: PROTEXIS

## (undated) DEVICE — TRADITIONAL MARYLAND DISSECTOR TIP, DISPOSABLE: Brand: RENEW

## (undated) DEVICE — PHOTONSABER Y METAL TIP

## (undated) DEVICE — FLEXIVA  PULSE  AND  FLEXIVA  PULSE  TRACTIP  LASER  FIBERS  ARE  HIGH  POWER  SINGLE-USE FIBER: Brand: FLEXIVA PULSE ID

## (undated) DEVICE — BATTERY

## (undated) DEVICE — BIPOLAR SEALER 23-301-1 AQM MBS: Brand: AQUAMANTYS™

## (undated) DEVICE — CLIP APPLIER WITH CLIP LOGIC TECHNOLOGY: Brand: ENDO CLIP III

## (undated) DEVICE — NEEDLE HPO 18GA 1.5IN ECLPS

## (undated) DEVICE — ANTERIOR HIP: Brand: MEDLINE INDUSTRIES, INC.

## (undated) DEVICE — ENDOPATH ULTRA VERESS INSUFFLATION NEEDLES WITH LUER LOCK CONNECTORS: Brand: ENDOPATH

## (undated) DEVICE — SLEEVE COMPR MD KNEE LEN SGL USE KENDALL SCD

## (undated) DEVICE — WEBRIL COTTON UNDERCAST PADDING: Brand: WEBRIL

## (undated) DEVICE — 3M™ TEGADERM™ TRANSPARENT FILM DRESSING, 1626W, 4 IN X 4-3/4 IN (10 CM X 12 CM), 50 EACH/CARTON, 4 CARTON/CASE: Brand: 3M™ TEGADERM™

## (undated) DEVICE — SURGIFLO ENDOSCOPIC APPICATOR: Brand: ETHICON

## (undated) DEVICE — #11 STERILE BLADE: Brand: POLYMER COATED BLADES

## (undated) DEVICE — SOLUTION  .9 3000ML

## (undated) DEVICE — NEPTUNE E-SEP SMOKE EVACUATION PENCIL, COATED, 70MM BLADE, PUSH BUTTON SWITCH: Brand: NEPTUNE E-SEP

## (undated) DEVICE — VISUALIZATION SYSTEM: Brand: CLEARIFY

## (undated) DEVICE — SUTURE MONOCRYL 2-0 SH

## (undated) DEVICE — 20 ML SYRINGE LUER-LOCK TIP: Brand: MONOJECT

## (undated) DEVICE — DERMABOND LIQUID ADHESIVE

## (undated) DEVICE — SOL  .9 1000ML BAG

## (undated) DEVICE — CHLORAPREP 26ML APPLICATOR

## (undated) DEVICE — CYSTO CDS-LF: Brand: MEDLINE INDUSTRIES, INC.

## (undated) DEVICE — SYRINGE MED 10ML LL TIP W/O SFTY DISP

## (undated) DEVICE — Device: Brand: JELCO

## (undated) DEVICE — MARYLAND JAW LAPAROSCOPIC SEALER/DIVIDER COATED: Brand: LIGASURE

## (undated) DEVICE — SUT COAT VCRL+ 0 27IN UR-6 ABSRB VLT ANTIBACT

## (undated) DEVICE — POUCH: SSEAL TYVEK 2000/CS: Brand: MEDICAL ACTION INDUSTRIES

## (undated) DEVICE — MINI ENDOCUT SCISSOR TIP, DISPOSABLE: Brand: RENEW

## (undated) DEVICE — PERINEAL POST PAD 1

## (undated) DEVICE — SOL  .9 3000ML

## (undated) DEVICE — MEGADYNE ELECTRODE ADULT PT RT

## (undated) DEVICE — SLEEVE KENDALL SCD EXPRESS MED

## (undated) DEVICE — SUT COAT VCRL + 0 54IN ABSRB UD ANTIBACT

## (undated) DEVICE — GAMMEX® PI HYBRID SIZE 7, STERILE POWDER-FREE SURGICAL GLOVE, POLYISOPRENE AND NEOPRENE BLEND: Brand: GAMMEX

## (undated) DEVICE — COVER,LIGHT,CAMERA,HARD,1/PK,STRL: Brand: MEDLINE

## (undated) DEVICE — SHEET,DRAPE,40X58,STERILE: Brand: MEDLINE

## (undated) DEVICE — ADHESIVE MASTISOL 2/3ML

## (undated) DEVICE — 450 ML BOTTLE OF 0.05% CHLORHEXIDINE GLUCONATE IN 99.95% STERILE WATER FOR IRRIGATION, USP AND APPLICATOR.: Brand: IRRISEPT ANTIMICROBIAL WOUND LAVAGE

## (undated) DEVICE — SYRINGE 30ML LL TIP

## (undated) DEVICE — TIGERTAIL 5F FLXTIP 70CM

## (undated) DEVICE — GOLDVAC PUSH BUTTON ELECTROSURGICAL SMOKE EVACUATION HANDPIECE: Brand: GOLDVAC

## (undated) DEVICE — APPLICATOR PREP 26ML CHG 2% ISO ALC 70%

## (undated) DEVICE — GRABBER GRASPER TIP, DISPOSABLE: Brand: RENEW

## (undated) DEVICE — UNDYED BRAIDED (POLYGLACTIN 910), SYNTHETIC ABSORBABLE SUTURE: Brand: COATED VICRYL

## (undated) DEVICE — BINDER ABD UNISX 9IN 62IN L AND XL UNIV

## (undated) DEVICE — SOLUTION IRRIG 1000ML 0.9% NACL USP BTL

## (undated) DEVICE — BANDAGE ADH 1INX3IN NAT FAB N ADH PD CURAD

## (undated) DEVICE — 40580 - THE PINK PAD - ADVANCED TRENDELENBURG POSITIONING KIT: Brand: 40580 - THE PINK PAD - ADVANCED TRENDELENBURG POSITIONING KIT

## (undated) DEVICE — SOL  .9 1000ML BTL

## (undated) DEVICE — TROCAR: Brand: KII® SLEEVE

## (undated) DEVICE — LAPCLINCH GRASPER TIP, DISPOSABLE: Brand: RENEW

## (undated) DEVICE — DALE ABDOMINAL BINDER, 12" WIDE, STRETCHES TO FIT 30"-45", 1 PER BOX.: Brand: DALE ABDOMINAL BINDER

## (undated) DEVICE — SPONGE STICK WITH PVP-I: Brand: KENDALL

## (undated) DEVICE — SUTURE PDS II 2-0 CP

## (undated) DEVICE — DRESSING SILVERLON ISLAND 11IN

## (undated) DEVICE — POSITIONER OR KT PT CR

## (undated) DEVICE — Device

## (undated) DEVICE — T5 HOOD WITH PEEL AWAY FACE SHIELD

## (undated) DEVICE — BLADE SAW SAGITTAL 19.5

## (undated) DEVICE — GAUZE SPONGES,12 PLY: Brand: CURITY

## (undated) DEVICE — GLOVE SUR 6.5 SENSICARE PI PIP CRM PWD F

## (undated) DEVICE — POUCH SPECIMEN WIRE 6X3 250ML

## (undated) DEVICE — STERILE POLYISOPRENE POWDER-FREE SURGICAL GLOVES: Brand: PROTEXIS

## (undated) DEVICE — CATHETER URET 5FR L70CM FLX OPN TIP NONPORTED

## (undated) DEVICE — NITINOL WIRE STR 038

## (undated) DEVICE — LAP CHOLE/APPY CDS-LF: Brand: MEDLINE INDUSTRIES, INC.

## (undated) DEVICE — HF-RESECTION ELECTRODE PLASMA-OVALBUTTON BUTTON, OVAL, 24 FR., 12°-30°, ESG TURIS: Brand: OLYMPUS

## (undated) DEVICE — Device: Brand: SUTURE PASSOR PRO

## (undated) DEVICE — SEAL Y BIOPSY PORT P6R SCOPE

## (undated) DEVICE — SYRINGE,TOOMEY,IRRIGATION,70CC,STERILE: Brand: MEDLINE

## (undated) DEVICE — L-HOOK CAUTERY PROBE TIP, DISPOSABLE: Brand: RENEW

## (undated) NOTE — LETTER
OUTSIDE TESTING RESULT REQUEST     IMPORTANT: FOR YOUR IMMEDIATE ATTENTION  Please FAX all test results listed below to: [phone]     Testing already done on or about: 4/10/23    * * * * If testing is NOT complete, arrange with patient A.S.A.P. * * * *      Patient Name: Zack Brown  Surgery Date: 2023  Medical Record: [id number]  CSN: [id number]  : 1950 - A: 74 y     Sex: male  Surgeon(s):  Goyo Jose MD  Procedure: CYSTOSCOPY TRANSURETHRAL RESECTION PROSTATE.  HOLMIUM LASER LITHOTRIPSY OF BLADDER STONES  Anesthesia Type: General     Surgeon: Goyo Jose MD     The following Testing and Time Line are REQUIRED PER ANESTHESIA     EKG READ AND SIGNED WITHIN   90 days      Thank You,   Sent by:Dione GUTIÉRREZ

## (undated) NOTE — LETTER
Toyin Sykes [address] [address]  [place], [address] [facility]    Consent for Operation  Date: __________________                                Time: _______________    1.  I authorize the performance upon Belem Griffith the following operation:  Procedure( revealed by the pictures or by descriptive texts accompanying them. If the procedure has been videotaped, the surgeon will obtain the original videotape. The hospital will not be responsible for storage or maintenance of this tape.   7. For the purpose of a THAT MY DOCTOR PROVIDED ME WITH THE ABOVE EXPLANATIONS, THAT ALL BLANKS OR STATEMENTS REQUIRING INSERTION OR COMPLETION WERE FILLED IN.     Signature of Patient:   ___________________________    When the patient is a minor or mentally incompetent to give co iii. All of the medicines I take (including prescriptions, herbal supplements, and pills I can buy without a prescription (including street drugs/illegal medications).  Failure to inform my anesthesiologist about these medicines may increase my risk of anes _____________________________________________________________________________  Anesthesiologist Signature     Date   Time  I have discussed the procedure and information above with the patient (or patient’s representative) and answered their questions.  The

## (undated) NOTE — ED AVS SNAPSHOT
[facility] Emergency Department in [address] [place] [address]    Phone:  [phone]    Fax:  [address]   MRN: [id number]    Department:  [facility] Emergency Department in [place]   Date of Visit: Quantity:  20 tablet   Commonly known as:  NORCO   Take 1-2 tablets by mouth every 4 (four) hours as needed for Pain.        ondansetron 4 MG Tbdp   Quantity:  10 tablet   Commonly known as:  ZOFRAN-ODT   Take 1 tablet (4 mg total) by mouth every 4 (four) h You were examined and treated today on an urgent basis only. This was not a substitute for ongoing medical care. Often, one Emergency Department visit does not uncover every injury or illness.  If you have been referred to a primary care or a specialist ph Britni Ortega [address] ([address]) [address]  Ez Zhu ([facility]) [phone] [address] ([address]) [phone]                Additional Information       We are concerned for your o reduction techniques were used. Dose information is transmitted to the ACR [employer]) NRDR ([address]) which includes the Dose Index Registry.      PATIENT STATED HISTORY: (As transcribed by Technologist)  Indu Visit [email or web address] to learn more.

## (undated) NOTE — LETTER
[address]  [address]  Authorization for Surgical Operation and Procedure     Date:___________                                                                                                         Time:__________  I hereby authorize Surgeon(s):  Anum Johnston MD, my physician and his/her assistants (if applicable), which may include medical students, residents, and/or fellows, to perform the following surgical operation/ procedure and administer such anesthesia as may be determined necessary by my physician:  Operation/Procedure name (s) Procedure(s):  LAPAROSCOPIC CHOLECYSTECTOMY WITH INDOCYANINE GREEN on Dawood Shelton   2.   I recognize that during the surgical operation/procedure, unforeseen conditions may necessitate additional or different procedures than those listed above.  I, therefore, further authorize and request that the above-named surgeon, assistants, or designees perform such procedures as are, in their judgment, necessary and desirable.    3.   My surgeon/physician has discussed prior to my surgery the potential benefits, risks and side effects of this procedure; the likelihood of achieving goals; and potential problems that might occur during recuperation.  They also discussed reasonable alternatives to the procedure, including risks, benefits, and side effects related to the alternatives and risks related to not receiving this procedure.  I have had all my questions answered and I acknowledge that no guarantee has been made as to the result that may be obtained.    4.   Should the need arise during my operation/procedure, which includes change of level of care prior to discharge, I also consent to the administration of blood and/or blood products.  Further, I understand that despite careful testing and screening of blood or blood products by collecting agencies, I may still be subject to ill effects as a result of receiving a blood transfusion and/or  blood products.  The following are some, but not all, of the potential risks that can occur: fever and allergic reactions, hemolytic reactions, transmission of diseases such as Hepatitis, AIDS and Cytomegalovirus (CMV) and fluid overload.  In the event that I wish to have an autologous transfusion of my own blood, or a directed donor transfusion, I will discuss this with my physician.  Check only if Refusing Blood or Blood Products  I understand refusal of blood or blood products as deemed necessary by my physician may have serious consequences to my condition to include possible death. I hereby assume responsibility for my refusal and release the hospital, its personnel, and my physicians from any responsibility for the consequences of my refusal.          o  Refuse      5.   I authorize the use of any specimen, organs, tissues, body parts or foreign objects that may be removed from my body during the operation/procedure for diagnosis, research or teaching purposes and their subsequent disposal by hospital authorities.  I also authorize the release of specimen test results and/or written reports to my treating physician on the hospital medical staff or other referring or consulting physicians involved in my care, at the discretion of the Pathologist or my treating physician.    6.   I consent to the photographing or videotaping of the operations or procedures to be performed, including appropriate portions of my body for medical, scientific, or educational purposes, provided my identity is not revealed by the pictures or by descriptive texts accompanying them.  If the procedure has been photographed/videotaped, the surgeon will obtain the original picture, image, videotape or CD.  The hospital will not be responsible for storage, release or maintenance of the picture, image, tape or CD.    7.   I consent to the presence of a  or observers in the operating room as deemed necessary by my physician  or their designees.    8.   I recognize that in the event my procedure results in extended X-Ray/fluoroscopy time, I may develop a skin reaction.    9. If I have a Do Not Attempt Resuscitation (DNAR) order in place, that status will be suspended while in the operating room, procedural suite, and during the recovery period unless otherwise explicitly stated by me (or a person authorized to consent on my behalf). The surgeon or my attending physician will determine when the applicable recovery period ends for purposes of reinstating the DNAR order.  10. Patients having a sterilization procedure: I understand that if the procedure is successful the results will be permanent and it will therefore be impossible for me to inseminate, conceive, or bear children.  I also understand that the procedure is intended to result in sterility, although the result has not been guaranteed.   11. I acknowledge that my physician has explained sedation/analgesia administration to me including the risk and benefits I consent to the administration of sedation/analgesia as may be necessary or desirable in the judgment of my physician.    I CERTIFY THAT I HAVE READ AND FULLY UNDERSTAND THE ABOVE CONSENT TO OPERATION and/or OTHER PROCEDURE.    _________________________________________  __________________________________  Signature of Patient     Signature of Responsible Person         ___________________________________         Printed Name of Responsible Person           _________________________________                 Relationship to Patient  _________________________________________  ______________________________  Signature of Witness          Date  Time      Patient Name: Dawood Shelton     : 1950                 Printed: January 15, 2025     Medical Record #: [id number]                     Page 1 of [address] [place] [address]  [address]    Consent for Anesthesia    I,  Dawood Shelton agree to be cared for by an anesthesiologist, who is specially trained to monitor me and give me medicine to put me to sleep or keep me comfortable during my procedure    I understand that my anesthesiologist is not an employee or agent of [facility] [employer]. He or she works for [employer].    As the patient asking for anesthesia services, I agree to:  Allow the anesthesiologist (anesthesia doctor) to give me medicine and do additional procedures as necessary. Some examples are: Starting or using an “IV” to give me medicine, fluids or blood during my procedure, and having a breathing tube placed to help me breathe when I’m asleep (intubation). In the event that my heart stops working properly, I understand that my anesthesiologist will make every effort to sustain my life, unless otherwise directed by [facility] Do Not Resuscitate documents.  Tell my anesthesia doctor before my procedure:  If I am pregnant.  The last time that I ate or drank.  All of the medicines I take (including prescriptions, herbal supplements, and pills I can buy without a prescription (including street drugs/illegal medications). Failure to inform my anesthesiologist about these medicines may increase my risk of anesthetic complications.  If I am allergic to anything or have had a reaction to anesthesia before.  I understand how the anesthesia medicine will help me (benefits).  I understand that with any type of anesthesia medicine there are risks:  The most common risks are: nausea, vomiting, sore throat, muscle soreness, damage to my eyes, mouth, or teeth (from breathing tube placement).  Rare risks include: remembering what happened during my procedure, allergic reactions to medications, injury to my airway, heart, lungs, vision, nerves, or muscles and in extremely rare instances death.  My doctor has explained to me other choices available to me for my care  (alternatives).  Pregnant Patients (“epidural”):  I understand that the risks of having an epidural (medicine given into my back to help control pain during labor), include itching, low blood pressure, difficulty urinating, headache or slowing of the baby’s heart. Very rare risks include infection, bleeding, seizure, irregular heart rhythms and nerve injury.  Regional Anesthesia (“spinal”, “epidural”, & “nerve blocks”):  I understand that rare but potential complications include headache, bleeding, infection, seizure, irregular heart rhythms, and nerve injury.    I can change my mind about having anesthesia services at any time before I get the medicine.    _____________________________________________________________________________  Patient (or Representative) Signature/Relationship to Patient  Date   Time    _____________________________________________________________________________   Name (if used)    Language/Organization   Time    _____________________________________________________________________________  Anesthesiologist Signature     Date   Time  I have discussed the procedure and information above with the patient (or patient’s representative) and answered their questions. The patient or their representative has agreed to have anesthesia services.    _____________________________________________________________________________  Witness        Date   Time  I have verified that the signature is that of the patient or patient’s representative, and that it was signed before the procedure  Patient Name: Dawood Shelton     : 1950                 Printed: January 15, 2025     Medical Record #: [id number]                     Page 2 of 2

## (undated) NOTE — LETTER
[address]  [address]  Authorization for Surgical Operation and Procedure     Date:___________                                                                                                         Time:__________  I hereby authorize Surgeon(s):  Anum Johnston MD, my physician and his/her assistants (if applicable), which may include medical students, residents, and/or fellows, to perform the following surgical operation/ procedure and administer such anesthesia as may be determined necessary by my physician:  Operation/Procedure name (s) Procedure(s):  LAPAROSCOPIC CHOLECYSTECTOMY WITH INDOCYANINE GREEN on Dawood Shelton   2.   I recognize that during the surgical operation/procedure, unforeseen conditions may necessitate additional or different procedures than those listed above.  I, therefore, further authorize and request that the above-named surgeon, assistants, or designees perform such procedures as are, in their judgment, necessary and desirable.    3.   My surgeon/physician has discussed prior to my surgery the potential benefits, risks and side effects of this procedure; the likelihood of achieving goals; and potential problems that might occur during recuperation.  They also discussed reasonable alternatives to the procedure, including risks, benefits, and side effects related to the alternatives and risks related to not receiving this procedure.  I have had all my questions answered and I acknowledge that no guarantee has been made as to the result that may be obtained.    4.   Should the need arise during my operation/procedure, which includes change of level of care prior to discharge, I also consent to the administration of blood and/or blood products.  Further, I understand that despite careful testing and screening of blood or blood products by collecting agencies, I may still be subject to ill effects as a result of receiving a blood transfusion and/or  blood products.  The following are some, but not all, of the potential risks that can occur: fever and allergic reactions, hemolytic reactions, transmission of diseases such as Hepatitis, AIDS and Cytomegalovirus (CMV) and fluid overload.  In the event that I wish to have an autologous transfusion of my own blood, or a directed donor transfusion, I will discuss this with my physician.  Check only if Refusing Blood or Blood Products  I understand refusal of blood or blood products as deemed necessary by my physician may have serious consequences to my condition to include possible death. I hereby assume responsibility for my refusal and release the hospital, its personnel, and my physicians from any responsibility for the consequences of my refusal.          o  Refuse      5.   I authorize the use of any specimen, organs, tissues, body parts or foreign objects that may be removed from my body during the operation/procedure for diagnosis, research or teaching purposes and their subsequent disposal by hospital authorities.  I also authorize the release of specimen test results and/or written reports to my treating physician on the hospital medical staff or other referring or consulting physicians involved in my care, at the discretion of the Pathologist or my treating physician.    6.   I consent to the photographing or videotaping of the operations or procedures to be performed, including appropriate portions of my body for medical, scientific, or educational purposes, provided my identity is not revealed by the pictures or by descriptive texts accompanying them.  If the procedure has been photographed/videotaped, the surgeon will obtain the original picture, image, videotape or CD.  The hospital will not be responsible for storage, release or maintenance of the picture, image, tape or CD.    7.   I consent to the presence of a  or observers in the operating room as deemed necessary by my physician  or their designees.    8.   I recognize that in the event my procedure results in extended X-Ray/fluoroscopy time, I may develop a skin reaction.    9. If I have a Do Not Attempt Resuscitation (DNAR) order in place, that status will be suspended while in the operating room, procedural suite, and during the recovery period unless otherwise explicitly stated by me (or a person authorized to consent on my behalf). The surgeon or my attending physician will determine when the applicable recovery period ends for purposes of reinstating the DNAR order.  10. Patients having a sterilization procedure: I understand that if the procedure is successful the results will be permanent and it will therefore be impossible for me to inseminate, conceive, or bear children.  I also understand that the procedure is intended to result in sterility, although the result has not been guaranteed.   11. I acknowledge that my physician has explained sedation/analgesia administration to me including the risk and benefits I consent to the administration of sedation/analgesia as may be necessary or desirable in the judgment of my physician.    I CERTIFY THAT I HAVE READ AND FULLY UNDERSTAND THE ABOVE CONSENT TO OPERATION and/or OTHER PROCEDURE.    _________________________________________  __________________________________  Signature of Patient     Signature of Responsible Person         ___________________________________         Printed Name of Responsible Person           _________________________________                 Relationship to Patient  _________________________________________  ______________________________  Signature of Witness          Date  Time      Patient Name: Dawood Shelton     : 1950                 Printed: January 15, 2025     Medical Record #: [id number]                     Page 1 of [address] [place] [address]  [address]    Consent for Anesthesia    I,  Dawood Shelton agree to be cared for by an anesthesiologist, who is specially trained to monitor me and give me medicine to put me to sleep or keep me comfortable during my procedure    I understand that my anesthesiologist is not an employee or agent of [facility] [employer]. He or she works for [employer].    As the patient asking for anesthesia services, I agree to:  Allow the anesthesiologist (anesthesia doctor) to give me medicine and do additional procedures as necessary. Some examples are: Starting or using an “IV” to give me medicine, fluids or blood during my procedure, and having a breathing tube placed to help me breathe when I’m asleep (intubation). In the event that my heart stops working properly, I understand that my anesthesiologist will make every effort to sustain my life, unless otherwise directed by [facility] Do Not Resuscitate documents.  Tell my anesthesia doctor before my procedure:  If I am pregnant.  The last time that I ate or drank.  All of the medicines I take (including prescriptions, herbal supplements, and pills I can buy without a prescription (including street drugs/illegal medications). Failure to inform my anesthesiologist about these medicines may increase my risk of anesthetic complications.  If I am allergic to anything or have had a reaction to anesthesia before.  I understand how the anesthesia medicine will help me (benefits).  I understand that with any type of anesthesia medicine there are risks:  The most common risks are: nausea, vomiting, sore throat, muscle soreness, damage to my eyes, mouth, or teeth (from breathing tube placement).  Rare risks include: remembering what happened during my procedure, allergic reactions to medications, injury to my airway, heart, lungs, vision, nerves, or muscles and in extremely rare instances death.  My doctor has explained to me other choices available to me for my care  (alternatives).  Pregnant Patients (“epidural”):  I understand that the risks of having an epidural (medicine given into my back to help control pain during labor), include itching, low blood pressure, difficulty urinating, headache or slowing of the baby’s heart. Very rare risks include infection, bleeding, seizure, irregular heart rhythms and nerve injury.  Regional Anesthesia (“spinal”, “epidural”, & “nerve blocks”):  I understand that rare but potential complications include headache, bleeding, infection, seizure, irregular heart rhythms, and nerve injury.    I can change my mind about having anesthesia services at any time before I get the medicine.    _____________________________________________________________________________  Patient (or Representative) Signature/Relationship to Patient  Date   Time    _____________________________________________________________________________   Name (if used)    Language/Organization   Time    _____________________________________________________________________________  Anesthesiologist Signature     Date   Time  I have discussed the procedure and information above with the patient (or patient’s representative) and answered their questions. The patient or their representative has agreed to have anesthesia services.    _____________________________________________________________________________  Witness        Date   Time  I have verified that the signature is that of the patient or patient’s representative, and that it was signed before the procedure  Patient Name: Dawood Shelton     : 1950                 Printed: January 15, 2025     Medical Record #: [id number]                     Page 2 of 2

## (undated) NOTE — ED AVS SNAPSHOT
THE [facility] Emergency Department in [address] [facility]    Phone:  [phone]    Fax:  [address]   MRN: [id number]    Department:  THE [facility] Emergency Department in [place]   Date of Visit: IF THERE IS ANY CHANGE OR WORSENING OF YOUR CONDITION, CALL YOUR PRIMARY CARE PHYSICIAN AT ONCE OR RETURN IMMEDIATELY TO THE EMERGENCY DEPARTMENT.     If you have been prescribed any medication(s), please fill your prescription right away and begin taking t

## (undated) NOTE — MR AVS SNAPSHOT
[address]  [address]  [place] [address]  [phone]               Thank you for choosing us for your health care visit with Brien Nicolas PA-C.   We are glad to serve you and happy to provide you with this sum Rinses help keep your sinuses and nose moist. Mix a teaspoon of salt in 8 ounces of fresh, warm water. Use a bulb syringe to gently squirt the water into your nose a few times a day. You can also buy ready-made saline nasal sprays.   Apply hot or cold packs season. Try not to touch your face. · As much as possible, stay away from infected people. · Follow these standbys for staying healthy: Eat balanced meals, exercise regularly, and get plenty of sleep.   Stay away from allergens  First find out what things This list is accurate as of: 3/3/17  9:28 AM.  Always use your most recent med list.                Amoxicillin-Pot Clavulanate 875-125 MG Tabs   Take 1 tablet by mouth 2 (two) times daily.    Commonly known as:  AUGMENTIN           DilTIAZem HCl ER Beads 3 medical emergencies, dial 911. Visit [email or web address] to learn more. Educational Information     Your blood pressure indicates you may be at-risk for Hypertension. Please consider the following Lifestyle Modifications.   Also, please